# Patient Record
Sex: MALE | Race: WHITE | NOT HISPANIC OR LATINO | Employment: OTHER | URBAN - METROPOLITAN AREA
[De-identification: names, ages, dates, MRNs, and addresses within clinical notes are randomized per-mention and may not be internally consistent; named-entity substitution may affect disease eponyms.]

---

## 2019-02-11 ENCOUNTER — OFFICE VISIT (OUTPATIENT)
Dept: URGENT CARE | Facility: MEDICAL CENTER | Age: 64
End: 2019-02-11
Payer: COMMERCIAL

## 2019-02-11 VITALS
TEMPERATURE: 98.7 F | DIASTOLIC BLOOD PRESSURE: 70 MMHG | HEIGHT: 69 IN | BODY MASS INDEX: 26.51 KG/M2 | WEIGHT: 179 LBS | HEART RATE: 76 BPM | OXYGEN SATURATION: 94 % | SYSTOLIC BLOOD PRESSURE: 126 MMHG

## 2019-02-11 DIAGNOSIS — J10.1 INFLUENZA A: ICD-10-CM

## 2019-02-11 DIAGNOSIS — H66.001 ACUTE SUPPURATIVE OTITIS MEDIA OF RIGHT EAR WITHOUT SPONTANEOUS RUPTURE OF TYMPANIC MEMBRANE, RECURRENCE NOT SPECIFIED: ICD-10-CM

## 2019-02-11 DIAGNOSIS — I10 ESSENTIAL HYPERTENSION: ICD-10-CM

## 2019-02-11 DIAGNOSIS — R05.9 COUGH: ICD-10-CM

## 2019-02-11 LAB
FLUAV+FLUBV AG SPEC QL IA: NORMAL
INT CON NEG: NEGATIVE
INT CON POS: POSITIVE

## 2019-02-11 PROCEDURE — 99203 OFFICE O/P NEW LOW 30 MIN: CPT | Performed by: PHYSICIAN ASSISTANT

## 2019-02-11 PROCEDURE — 87804 INFLUENZA ASSAY W/OPTIC: CPT | Performed by: PHYSICIAN ASSISTANT

## 2019-02-11 RX ORDER — AMOXICILLIN 875 MG/1
875 TABLET, COATED ORAL 2 TIMES DAILY
Qty: 14 TAB | Refills: 0 | Status: SHIPPED | OUTPATIENT
Start: 2019-02-11 | End: 2019-02-18

## 2019-02-11 RX ORDER — OSELTAMIVIR PHOSPHATE 75 MG/1
75 CAPSULE ORAL 2 TIMES DAILY
Qty: 10 CAP | Refills: 0 | Status: SHIPPED | OUTPATIENT
Start: 2019-02-11 | End: 2019-02-16

## 2019-02-11 RX ORDER — DEXTROMETHORPHAN HYDROBROMIDE AND PROMETHAZINE HYDROCHLORIDE 15; 6.25 MG/5ML; MG/5ML
5 SYRUP ORAL 4 TIMES DAILY PRN
Qty: 100 ML | Refills: 0 | Status: SHIPPED | OUTPATIENT
Start: 2019-02-11 | End: 2019-02-16

## 2019-02-11 RX ORDER — BENZONATATE 100 MG/1
200 CAPSULE ORAL 3 TIMES DAILY PRN
Qty: 60 CAP | Refills: 0 | Status: SHIPPED | OUTPATIENT
Start: 2019-02-11 | End: 2019-03-25

## 2019-02-11 ASSESSMENT — ENCOUNTER SYMPTOMS
HEADACHES: 1
MYALGIAS: 1
DIZZINESS: 1
COUGH: 0
VOMITING: 0
CHILLS: 1
FEVER: 1
DIARRHEA: 0
NAUSEA: 0
SORE THROAT: 1

## 2019-02-12 ENCOUNTER — TELEPHONE (OUTPATIENT)
Dept: SCHEDULING | Facility: IMAGING CENTER | Age: 64
End: 2019-02-12

## 2019-02-12 NOTE — PATIENT INSTRUCTIONS
"Otitis Media, Adult  Otitis media is redness, soreness, and puffiness (swelling) in the space just behind your eardrum (middle ear). It may be caused by allergies or infection. It often happens along with a cold.  Follow these instructions at home:  · Take your medicine as told. Finish it even if you start to feel better.  · Only take over-the-counter or prescription medicines for pain, discomfort, or fever as told by your doctor.  · Follow up with your doctor as told.  Contact a doctor if:  · You have otitis media only in one ear, or bleeding from your nose, or both.  · You notice a lump on your neck.  · You are not getting better in 3-5 days.  · You feel worse instead of better.  Get help right away if:  · You have pain that is not helped with medicine.  · You have puffiness, redness, or pain around your ear.  · You get a stiff neck.  · You cannot move part of your face (paralysis).  · You notice that the bone behind your ear hurts when you touch it.  This information is not intended to replace advice given to you by your health care provider. Make sure you discuss any questions you have with your health care provider.  Document Released: 06/05/2009 Document Revised: 05/25/2017 Document Reviewed: 07/15/2014  [employer] Interactive Patient Education © 2017 [employer]  Influenza, Adult  Influenza (“the flu\") is an infection in the lungs, nose, and throat (respiratory tract). It is caused by a virus. The flu causes many common cold symptoms, as well as a high fever and body aches. It can make you feel very sick.  The flu spreads easily from person to person (is contagious). Getting a flu shot (influenza vaccination) every year is the best way to prevent the flu.  Follow these instructions at home:  · Take over-the-counter and prescription medicines only as told by your doctor.  · Use a cool mist humidifier to add moisture (humidity) to the air in your home. This can make it easier to breathe.  · Rest as " needed.  · Drink enough fluid to keep your pee (urine) clear or pale yellow.  · Cover your mouth and nose when you cough or sneeze.  · Wash your hands with soap and water often, especially after you cough or sneeze. If you cannot use soap and water, use hand .  · Stay home from work or school as told by your doctor. Unless you are visiting your doctor, try to avoid leaving home until your fever has been gone for 24 hours without the use of medicine.  · Keep all follow-up visits as told by your doctor. This is important.  How is this prevented?  · Getting a yearly (annual) flu shot is the best way to avoid getting the flu. You may get the flu shot in late summer, fall, or winter. Ask your doctor when you should get your flu shot.  · Wash your hands often or use hand  often.  · Avoid contact with people who are sick during cold and flu season.  · Eat healthy foods.  · Drink plenty of fluids.  · Get enough sleep.  · Exercise regularly.  Contact a doctor if:  · You get new symptoms.  · You have:  ¨ Chest pain.  ¨ Watery poop (diarrhea).  ¨ A fever.  · Your cough gets worse.  · You start to have more mucus.  · You feel sick to your stomach (nauseous).  · You throw up (vomit).  Get help right away if:  · You start to be short of breath or have trouble breathing.  · Your skin or nails turn a bluish color.  · You have very bad pain or stiffness in your neck.  · You get a sudden headache.  · You get sudden pain in your face or ear.  · You cannot stop throwing up.  This information is not intended to replace advice given to you by your health care provider. Make sure you discuss any questions you have with your health care provider.  Document Released: 09/26/2009 Document Revised: 05/25/2017 Document Reviewed: 10/11/2016  [employer] Interactive Patient Education © 2017 [employer]

## 2019-02-12 NOTE — PROGRESS NOTES
Subjective:   Isma Vyas is a 63 y.o. male who presents for Nasal Congestion (thinks has the flu, started saturday, headaches, ear pain, off balance, cant sleep, fever, hard to take deep breaths, cough, congested)    This is a new problem.  Patient presents to urgent care with 2-1/2-day history of fever, chills, body aches, nasal congestion and cough.  Patient has been doing a [place] pot and reports significant nasal drainage after using the [place] pot.  He also has been complaining of feeling somewhat off balance with right ear pain that is intermittent and a headache.  The patient reports that he is not been able to sleep well because of feeling ill.  The patient did receive an influenza vaccine this season.    Past medical history, family history and social history are reviewed and updated in the record today.         HPI  Past Medical History:   Diagnosis Date   • Hypertension    • Thyroid disease      History reviewed. No pertinent surgical history.  Social History     Social History   • Marital status:      Spouse name: N/A   • Number of children: N/A   • Years of education: N/A     Occupational History   • Not on file.     Social History Main Topics   • Smoking status: Never Smoker   • Smokeless tobacco: Never Used   • Alcohol use Yes      Comment: 10   • Drug use: Yes     Types: Marijuana   • Sexual activity: Not on file     Other Topics Concern   • Not on file     Social History Narrative   • No narrative on file      Family History   Problem Relation Age of Onset   • Cancer Mother    • Cancer Father       Review of Systems   Constitutional: Positive for chills, fever and malaise/fatigue.   HENT: Positive for congestion, ear pain and sore throat.    Respiratory: Negative for cough.    Cardiovascular: Negative for chest pain.   Gastrointestinal: Negative for diarrhea, nausea and vomiting.   Musculoskeletal: Positive for myalgias.   Neurological: Positive for dizziness and headaches.   All other  "systems reviewed and are negative.    No Known Allergies     Objective:   /70   Pulse 76   Temp 37.1 °C (98.7 °F) (Temporal)   Ht 1.753 m (5' 9\")   Wt 81.2 kg (179 lb)   SpO2 94%   BMI 26.43 kg/m²    Physical Exam   Constitutional: He is oriented to person, place, and time. He appears well-developed and well-nourished.  Non-toxic appearance. He appears ill.   HENT:   Head: Normocephalic and atraumatic.   Right Ear: External ear and ear canal normal. No mastoid tenderness. Tympanic membrane is injected. Tympanic membrane is not bulging. A middle ear effusion is present.   Left Ear: Tympanic membrane, external ear and ear canal normal.   Nose: Mucosal edema and rhinorrhea present. Right sinus exhibits no maxillary sinus tenderness and no frontal sinus tenderness. Left sinus exhibits no maxillary sinus tenderness and no frontal sinus tenderness.   Mouth/Throat: Uvula is midline and mucous membranes are normal. Posterior oropharyngeal erythema present. No oropharyngeal exudate or posterior oropharyngeal edema. Tonsils are 1+ on the right. Tonsils are 1+ on the left. No tonsillar exudate.   Eyes: Pupils are equal, round, and reactive to light. Conjunctivae and EOM are normal.   Neck: Normal range of motion. Neck supple.   Cardiovascular: Normal rate, regular rhythm and normal heart sounds.  Exam reveals no friction rub.    No murmur heard.  Pulmonary/Chest: Effort normal and breath sounds normal. No respiratory distress.   Abdominal: Soft. Bowel sounds are normal. There is no hepatosplenomegaly. There is no tenderness.   Musculoskeletal: Normal range of motion.   Lymphadenopathy:        Head (right side): No submental, no submandibular and no tonsillar adenopathy present.        Head (left side): No submental, no submandibular and no tonsillar adenopathy present.     He has no cervical adenopathy.        Right: No supraclavicular adenopathy present.        Left: No supraclavicular adenopathy present. "   Neurological: He is alert and oriented to person, place, and time. He has normal strength. No cranial nerve deficit or sensory deficit. Coordination normal.   Skin: Skin is warm and dry. No rash noted.   Psychiatric: He has a normal mood and affect. Judgment normal.           Assessment/Plan:   Assessment    1. Influenza A  - oseltamivir (TAMIFLU) 75 MG Cap; Take 1 Cap by mouth 2 times a day for 5 days.  Dispense: 10 Cap; Refill: 0  - POCT Influenza A/B: + Influenza A    Remainder of review of systems unable to be completed due to child's young age patient will be treated with Tamiflu as above.  Symptomatic, supportive care.  Increase fluids, rest.  Contagion reviewed.  Printed information with patient education on Tamiflu provided.      2. Acute suppurative otitis media of right ear without spontaneous rupture of tympanic membrane, recurrence not specified  - amoxicillin (AMOXIL) 875 MG tablet; Take 1 Tab by mouth 2 times a day for 7 days.  Dispense: 14 Tab; Refill: 0    Patient does have an early right otitis media.  He will be treated with amoxicillin as above.    3. Cough  - benzonatate (TESSALON) 100 MG Cap; Take 2 Caps by mouth 3 times a day as needed.  Dispense: 60 Cap; Refill: 0  - promethazine-dextromethorphan (PROMETHAZINE-DM) 6.25-15 MG/5ML syrup; Take 5 mL by mouth 4 times a day as needed for Cough for up to 5 days.  Dispense: 100 mL; Refill: 0  Patient is also given a prescription for Tessalon Perles for daytime cough and promethazine DM for nighttime cough.  Patient is instructed not to drive while taking promethazine DM as this can cause drowsiness.      4. Essential hypertension  Counseled patient on avoidance of medications containing decongestants as this can elevate blood pressure.    Differential diagnosis, natural history, supportive care, and indications for immediate follow-up discussed.    If not improving in 3-5 days, F/U with PCP or return to  or sooner if worsens  Strict ER precautions  given.    Please note that this note was created using voice recognition speech to text software. Every effort has been made to correct obvious errors.  However, I expect there are errors of grammar and possibly context that were not discovered prior to finalizing the note

## 2019-03-25 ENCOUNTER — OFFICE VISIT (OUTPATIENT)
Dept: INTERNAL MEDICINE | Facility: MEDICAL CENTER | Age: 64
End: 2019-03-25
Payer: COMMERCIAL

## 2019-03-25 VITALS
HEART RATE: 62 BPM | BODY MASS INDEX: 26.42 KG/M2 | OXYGEN SATURATION: 94 % | TEMPERATURE: 98.1 F | DIASTOLIC BLOOD PRESSURE: 90 MMHG | WEIGHT: 178.4 LBS | HEIGHT: 69 IN | SYSTOLIC BLOOD PRESSURE: 140 MMHG

## 2019-03-25 DIAGNOSIS — J30.89 ENVIRONMENTAL AND SEASONAL ALLERGIES: ICD-10-CM

## 2019-03-25 DIAGNOSIS — F41.0 PANIC ANXIETY SYNDROME: ICD-10-CM

## 2019-03-25 DIAGNOSIS — R19.7 DIARRHEA IN ADULT PATIENT: ICD-10-CM

## 2019-03-25 DIAGNOSIS — I83.893 VARICOSE VEINS OF BILATERAL LOWER EXTREMITIES WITH OTHER COMPLICATIONS: ICD-10-CM

## 2019-03-25 DIAGNOSIS — I10 ESSENTIAL HYPERTENSION: ICD-10-CM

## 2019-03-25 DIAGNOSIS — Z00.00 HEALTH CARE MAINTENANCE: ICD-10-CM

## 2019-03-25 DIAGNOSIS — Z87.828 HX OF TEAR OF MENISCUS OF KNEE JOINT: ICD-10-CM

## 2019-03-25 DIAGNOSIS — J32.8 OTHER CHRONIC SINUSITIS: ICD-10-CM

## 2019-03-25 DIAGNOSIS — E03.8 OTHER SPECIFIED HYPOTHYROIDISM: ICD-10-CM

## 2019-03-25 PROBLEM — I83.93 VARICOSE VEINS OF BOTH LOWER EXTREMITIES: Status: ACTIVE | Noted: 2019-03-25

## 2019-03-25 PROBLEM — F40.243 ANXIETY WITH FLYING: Status: ACTIVE | Noted: 2019-03-25

## 2019-03-25 PROCEDURE — 99204 OFFICE O/P NEW MOD 45 MIN: CPT | Mod: GC | Performed by: INTERNAL MEDICINE

## 2019-03-25 RX ORDER — LOSARTAN POTASSIUM 100 MG/1
100 TABLET ORAL DAILY
COMMUNITY
End: 2019-04-25 | Stop reason: SDUPTHER

## 2019-03-25 RX ORDER — LEVOTHYROXINE SODIUM 0.05 MG/1
50 TABLET ORAL
COMMUNITY
End: 2019-05-28 | Stop reason: SDUPTHER

## 2019-03-25 RX ORDER — AZELASTINE 1 MG/ML
1 SPRAY, METERED NASAL 2 TIMES DAILY
COMMUNITY
End: 2021-11-29 | Stop reason: SDUPTHER

## 2019-03-25 RX ORDER — FLUTICASONE PROPIONATE 50 MCG
1 SPRAY, SUSPENSION (ML) NASAL DAILY
COMMUNITY

## 2019-03-25 RX ORDER — HYDROXYZINE HYDROCHLORIDE 25 MG/1
25 TABLET, FILM COATED ORAL 3 TIMES DAILY PRN
Qty: 30 TAB | Refills: 2 | Status: SHIPPED | OUTPATIENT
Start: 2019-03-25 | End: 2021-10-27

## 2019-03-25 RX ORDER — SILDENAFIL 100 MG/1
100 TABLET, FILM COATED ORAL PRN
COMMUNITY

## 2019-03-25 RX ORDER — DIPHENOXYLATE HYDROCHLORIDE AND ATROPINE SULFATE 2.5; .025 MG/1; MG/1
1 TABLET ORAL 4 TIMES DAILY PRN
COMMUNITY

## 2019-03-25 ASSESSMENT — PATIENT HEALTH QUESTIONNAIRE - PHQ9: CLINICAL INTERPRETATION OF PHQ2 SCORE: 0

## 2019-03-25 NOTE — PROGRESS NOTES
New Patient to Establish    Reason to establish: New patient to establish    CC: new patient to establish    HPI: 63M, PMH of numerous sinus infections (more so as a child), HTN, hypothyroid, environmental and seasonal allergies, panic anxiety syndrome, varicose veins, hx of meniscus tear; presents as a new patient to establish. His only current complaints are pruritis with his varicose veins and left knee pain that was exacerbated helping his wife move with his history of meniscus tear.     He does have panic anxiety syndrome, most notable with flying after a traumatic experience but occasionally sporadically and takes xanax prn. He takes viagra because of the side effects of losartan. He also occasionally gets diarrhea for 1-2 days every 2 months or so due to changes in diet from travelling and takes lomotil.    His past surgeries and injuries include a nasal deviation surgery that moved the deviation to the other side per patient. Minor meniscus tear 15y/o for his left knee with repair which showed significant arthritis which he uses aleeve for relief. Hernia repair 5-6y/a for umbilical, right inguinal, and one other he is unable to recall. Back injury 8-10y/a where he fell and broke some ribs was taking opiods for 6 weeks but otherwise resolved. He has a ripped right elbow tendon s/p repair but occasionally has elbow joint swelling. Cataract surgery 3-4 y/a, some floaters after but otherwise no complications.    He currently lives in the [facility] area with his partner, recently moved from [place], currently works as a consultant for [employer]. He and his partner are both  but otherwise live separately from their spouses. He denies tobacco use history, occasional etoh and marijuana use, no other drugs. He was previously taking truvada, but has been monogamous with his partner.    He states he had an anal pap showing ACUS with a specialist in [place] stating he should be followed up in 2-3 years, he was  unable to specify which kind of specialist, will follow up once records obtained. Last colonoscopy within 1-2years; f/u in 5-10year; will get records.      There are no active problems to display for this patient.      Past Medical History:   Diagnosis Date   • Hypertension    • Thyroid disease        Current Outpatient Prescriptions   Medication Sig Dispense Refill   • losartan (COZAAR) 100 MG Tab Take 100 mg by mouth every day.     • levothyroxine (SYNTHROID) 50 MCG Tab Take 50 mcg by mouth Every morning on an empty stomach.     • azelastine (ASTELIN) 137 MCG/SPRAY nasal spray [place] 1 Spray in nose 2 times a day.     • fluticasone (FLONASE) 50 MCG/ACT nasal spray Spray 1 Spray in nose every day.     • diphenoxylate-atropine (LOMOTIL) 2.5-0.025 MG Tab Take 1 Tab by mouth 4 times a day as needed for Diarrhea.     • sildenafil citrate (VIAGRA) 100 MG tablet Take 100 mg by mouth as needed for Erectile Dysfunction.     • ALPRAZolam (XANAX PO) Take  by mouth.     • CEFPODOXIME PROXETIL PO Take  by mouth.     • PrednisoLONE Sodium Phosphate (PREDNISOL OP) by Ophthalmic route.     • benzonatate (TESSALON) 100 MG Cap Take 2 Caps by mouth 3 times a day as needed. 60 Cap 0     No current facility-administered medications for this visit.        Allergies as of 03/25/2019   • (No Known Allergies)       Social History     Social History   • Marital status:      Spouse name: N/A   • Number of children: N/A   • Years of education: N/A     Occupational History   • Not on file.     Social History Main Topics   • Smoking status: Never Smoker   • Smokeless tobacco: Never Used   • Alcohol use 4.2 - 6.0 oz/week     7 - 10 Cans of beer per week   • Drug use: Yes     Types: Marijuana   • Sexual activity: Not on file     Other Topics Concern   • Not on file     Social History Narrative   • No narrative on file       Family History   Problem Relation Age of Onset   • Cancer Mother    • Cancer Father        History reviewed. No  "pertinent surgical history.    ROS: As per HPI. Additional pertinent symptoms as noted below.    Constitutional: denies fever/chills  Eyes: denies vision changes/eye pain  ENT: denies hearing changes, ear/nose/mouth/throat pain  Cardiovascular: denies chest pain/palpitations  Respiratory: denies dyspnea, cough, wheezing  GI: denies abdominal pain, nausea, vomiting, diarrhea, constipation, hematechezia, melena  : denies dysuria, hematuria  Musculo-skeletal: denies muscle,joint,bone pain  Skin: pruritis denies skin changes, rashes  Neurological: denies lightheadedness, headaches  Psychological: denies anxiety, depression      /90 (BP Location: Left arm, Patient Position: Sitting, BP Cuff Size: Adult)   Pulse 62   Temp 36.7 °C (98.1 °F) (Temporal)   Ht 1.74 m (5' 8.5\")   Wt 80.9 kg (178 lb 6.4 oz)   SpO2 94%   BMI 26.73 kg/m²     Physical Exam  General:  Alert and oriented, No apparent distress.    Eyes: Pupils equal and reactive. No scleral icterus.    Throat: Clear no erythema or exudates noted.    Neck: Supple. No lymphadenopathy noted. Thyroid not enlarged.    Lungs: Clear to auscultation and percussion bilaterally.    Cardiovascular: Regular rate and rhythm. No murmurs, rubs or gallops.    Abdomen:  Benign. No rebound or guarding noted.    Extremities: No clubbing, cyanosis, edema.    Skin: Clear. No rash or suspicious skin lesions noted.          Assessment and Plan    1. Essential hypertension  -140/90 today  -continue losartan    2. Other specified hypothyroidism  -continue levothyroxine    3. Environmental and seasonal allergies  -continue astelin, flonase    4. Panic anxiety syndrome  -most notable with flying after a traumatic experience but occasionally sporadically  -continue xanax, patient reports minimal use, not requesting refill at this time  -hyroxyzine prn    5. Diarrhea in adult patient  -occasionally gets diarrhea for 1-2 days every 2 months or so due to changes in diet from " travelling  -continue lomotil    6. Hx of tear of meniscus of knee joint  -physical therapy    7. Varicose veins of bilateral lower extremities with other complications  -patient has pruritis with varicose veins  -patient uses topical OTC creams with minor relief  -hydroxyzine prn    8. Health care maintenance  -patient reports up to date with Tetanus shot, shingles vaccination, influenza vaccination  -Last colonoscopy within 1-2years; f/u in 5-10year; will get records  -patient reports he received anal pap, ACUS, specialist says should be followed up, 2-3 years; will obtain records  -patient had CBC, CMP, lipid panel, TSH, PSA frome 8/2018; reviewed with patient and wnl    9. Other chronic sinusitis  -patient reports history of recurrent sinutitis more often as a child   -per patient prednisolone, cefpodoxime are the only regimen that has worked for him   -patient cites he's tried azithromycin, amoxicillin (only helps for ears), augmentin (some relief), doxycyline (some relief to none)    Signed by: Hima Rodrigez M.D.

## 2019-03-25 NOTE — LETTER
[facility]  Hima Rodrigez M.D.  [address] [facility] [address]  [place] [address]  Fax: [phone]   Authorization for Release/Disclosure of   Protected Health Information   Name: ISMA ZUÑIGA : 1955 SSN: [id number]   Address: [address] [place] [address] [place] [place] [address] Phone:    [phone] (home)    I authorize the entity listed below to release/disclose the PHI below to:   [facility]/Hima Rodrigez M.D. and Hima Rodrigez M.D.   Provider or Entity Name: Dr. Matt Whatley     Address [address], [place], [place] Phone: [phone]      Fax:     Reason for request: continuity of care   Information to be released:    [  ] LAST COLONOSCOPY,  including any PATH REPORT and follow-up  [  ] LAST FIT/COLOGUARD RESULT [  ] LAST DEXA  [  ] LAST MAMMOGRAM  [  ] LAST PAP  [  ] LAST LABS [  ] RETINA EXAM REPORT  [  ] IMMUNIZATION RECORDS  [ x ] Release all info      [  ] Check here and initial the line next to each item to release ALL health information INCLUDING  _____ Care and treatment for drug and / or alcohol abuse  _____ HIV testing, infection status, or AIDS  _____ Genetic Testing    DATES OF SERVICE OR TIME PERIOD TO BE DISCLOSED: _____________  I understand and acknowledge that:  * This Authorization may be revoked at any time by you in writing, except if your health information has already been used or disclosed.  * Your health information that will be used or disclosed as a result of you signing this authorization could be re-disclosed by the recipient. If this occurs, your re-disclosed health information may no longer be protected by State or Federal laws.  * You may refuse to sign this Authorization. Your refusal will not affect your ability to obtain treatment.  * This Authorization becomes effective upon signing and will  on (date) __________.      If no date is indicated, this Authorization will  one (1) year from the signature date.    Name: Isma Zuñiga    Signature:   Date:          3/25/2019       PLEASE FAX REQUESTED RECORDS BACK TO: [phone]

## 2019-03-26 PROBLEM — J32.8 OTHER CHRONIC SINUSITIS: Status: ACTIVE | Noted: 2019-03-26

## 2019-04-08 ENCOUNTER — APPOINTMENT (OUTPATIENT)
Dept: PHYSICAL THERAPY | Facility: REHABILITATION | Age: 64
End: 2019-04-08
Attending: STUDENT IN AN ORGANIZED HEALTH CARE EDUCATION/TRAINING PROGRAM
Payer: COMMERCIAL

## 2019-04-10 ENCOUNTER — PHYSICAL THERAPY (OUTPATIENT)
Dept: PHYSICAL THERAPY | Facility: REHABILITATION | Age: 64
End: 2019-04-10
Attending: STUDENT IN AN ORGANIZED HEALTH CARE EDUCATION/TRAINING PROGRAM
Payer: COMMERCIAL

## 2019-04-10 DIAGNOSIS — M76.892 LEFT KNEE TENDONITIS: ICD-10-CM

## 2019-04-10 PROCEDURE — 97535 SELF CARE MNGMENT TRAINING: CPT

## 2019-04-10 PROCEDURE — 97162 PT EVAL MOD COMPLEX 30 MIN: CPT

## 2019-04-10 PROCEDURE — 97014 ELECTRIC STIMULATION THERAPY: CPT

## 2019-04-10 ASSESSMENT — ENCOUNTER SYMPTOMS
EXACERBATED BY: SQUATTING
PAIN SCALE AT HIGHEST: 6
PAIN SCALE: 2
QUALITY: SHARP
PAIN TIMING: CONSTANT
PAIN SCALE AT LOWEST: 1
PAIN TIMING: WHEN ACTIVE
QUALITY: ACHING
EXACERBATED BY: STAIR CLIMBING

## 2019-04-10 NOTE — OP THERAPY EVALUATION
Outpatient Physical Therapy  INITIAL EVALUATION    [facility] [address] [facility], [address]  [place] [address]  Phone:  [phone]    Date of Evaluation: 04/10/2019    Patient: Isma Vyas  YOB: 1955  MRN: [id number]     Referring Provider: Hima Rodrigez M.D.  [address]  [address] [place] [address]   Referring Diagnosis Personal history of other (healed) physical injury and trauma [Z87.828]     Time Calculation  Start time: 130  Stop time: 310 Time Calculation (min): 100 minutes     Physical Therapy Occurrence Codes    Date of onset of impairment:  3/25/19   Date physical therapy care plan established or reviewed:  4/10/19   Date physical therapy treatment started:  4/10/19          Chief Complaint: No chief complaint on file.    Visit Diagnoses     ICD-10-CM   1. Left knee tendonitis M76.892         Subjective:   History of Present Illness:     Mechanism of injury:  10 years ago L. menisectomy from injury when training for marathon. The surgery was minor but there was a long recovery involved. Moved in 2019, that is when knee pain intensified. I also had been hiking and running on uneven terrain in 2019 and that has been bothersome. Upcoming visit with MD regarding stem cell injection. Denies locking of knee.     Past medical Hx:  Tear in R. Triceps, complete rupture, surgical repair ; R. Rib fracture -; family hx of hand OA;   Pain:     Current pain ratin    At best pain ratin    At worst pain ratin    Location:  Posterior and anterior knee    Quality:  Aching and sharp    Pain timing:  Constant and when active    Aggravating factors:  Squatting and stair climbing    Progression:  Worsening  Hand dominance:  Right  Treatments:     Previous treatment:  Physical therapy  Patient Goals:     Patient goals for therapy:  Return to sport/leisure activities    Other patient goals:  Play tennis, running,       Past Medical History:    Diagnosis Date   • Hypertension    • Thyroid disease      No past surgical history on file.  Social History   Substance Use Topics   • Smoking status: Never Smoker   • Smokeless tobacco: Never Used   • Alcohol use 4.2 - 6.0 oz/week     7 - 10 Cans of beer per week     Family and Occupational History     Social History   • Marital status:      Spouse name: N/A   • Number of children: N/A   • Years of education: N/A       Objective     Observations     Additional Observation Details  Collapsing arches bilatera  Excessive tibial external rotation bilateral     Tenderness   Left Knee   Tenderness in the ITB, medial patella, patellar tendon and pes anserinus. No tenderness in the lateral joint line, LCL (distal), LCL (proximal), MCL (distal), MCL (proximal) and medial joint line.     Additional Tenderness Details  (+) gerdy's tubercle tenderness     Active Range of Motion   Left Knee   Normal active range of motion    Additional Active Range of Motion Details  Mild extensor lag with L. Straight leg raise    Passive Range of Motion   Left Knee   Flexion: WFL and with pain  Extension: WFL    Patellar Mobility     Additional Patellar Mobility Details  Crepitus at PFJ     Strength:      Left Knee   Flexion: 5  Extension: 4    Additional Strength Details  Mild pain anterior knee with resisted extension     Tests     Left Knee   Negative anterior drawer, lateral Tasneem, medial Tasneem, valgus stress test at 0 degrees, valgus stress test at 30 degrees, varus stress test at 0 degrees and varus stress test at 30 degrees.     Additional Tests Details  (+) riya test for ITB restriction L > R    Single leg stance: unable to hold longer than 3 seconds bilateral, no pain.         Therapeutic Exercises (CPT 48845):     1. HS stretch    2. Calf stretch    3. Piriformis stretch    4. ITB stretch    5. Lateral hip stretch     Therapeutic Treatments and Modalities:     1. Functional Training, Self Care (CPT 93049), left knee,  HEP; orthotic; high cushion shoe, activity participation/modifcation education     Time-based treatments/modalities:  Therapeutic exercise minutes (CPT 51155): 8 minutes  Functional training, self care minutes (CPT 17066): 25 minutes       Assessment, Response and Plan:   Impairments: abnormal or restricted ROM and activity intolerance    Assessment details:  Mr. Vyas is referred to PT for left knee pain that began in January to March 2019 when trail running and moving. He has history of left menisectomy 10 years ago. He feels he recovered well but the rehab afterwards was difficult. Eval findings were most consistent with tendonitis at pes anserine, gerdy's tubercle, and patellar tendon. Meniscus testing was negative, crepitus in his knee with active and passive movement was minimal, and ligamentous stress testing was strong/stable and pain free. Mr. Vyas has foot/ankle structural issues, collapsing arches, flat feet and over pronation. He also moderately stiff hips for rotation and flexion. Mr. Vyas can benefit from PT to improve hip mobility, improve foot/ankle stability and proprioception, and to improve his balance as strategies for remaining active with less knee pain.   Barriers to therapy:  Age  Prognosis: good    Goals:   Short Term Goals:   PROM knee flexion full ROM and normal end feel   No tenderness with palpation of gerdy's tubercle, pes anserine, and quad tendon  5/5 knee extension strength without pain at anterior knee  Short term goal time span:  2-4 weeks      Long Term Goals:    Patient will be independent for long term home exercise program, along with any progressions or regressions from previous, and all self care strategies   Able to run 3 miles with 0-2/10 knee pain  Able to single leg stance 15 seconds without loss of balance and knee pain  Significant improvement in perceived functional mobility through improvement in LEFS score  Long term goal time span:  4-6 weeks    Plan:    Planned therapy interventions:  E Stim Unattended (CPT 04621), Functional Training, Self Care (CPT 68066), Hot or Cold Pack Therapy (CPT 73639), Manual Therapy (CPT 18382), Neuromuscular Re-education (CPT 67484), Self Care ADL Training (CPT 86381), Therapeutic Activities (CPT 88462) and Therapeutic Exercise (CPT 18316)  Frequency:  2x week  Duration in weeks:  6  Discussed with:  Patient      Functional Limitations and Severity Modifiers      Current:     Goal:       Referring provider co-signature:  I have reviewed this plan of care and my co-signature certifies the need for services.      Physician Signature: ________________________________ Date: ______________

## 2019-04-17 NOTE — OP THERAPY DISCHARGE SUMMARY
"  Outpatient Physical Therapy  DISCHARGE SUMMARY NOTE      [facility] [address], [address]  [place] [address]  Phone:  [phone]    Date of Visit: 04/10/2019    Patient: Isma Vyas  YOB: 1955  MRN: [id number]     Referring Provider: Hima Rodrigez M.D.  [address] [place] [address]   Referring Diagnosis Personal history of other (healed) physical injury and trauma [Z87.828]     Physical Therapy Occurrence Codes    Date of onset of impairment:  3/25/19   Date physical therapy care plan established or reviewed:  4/10/19   Date physical therapy treatment started:  4/10/19          Functional Limitations and Severity Modifiers      Goal:     Discharge:         Your patient is being discharged from Physical Therapy with the following comments:   · Patient has failed to schedule or reschedule follow-up visits    Comments:  Patient had eval only than cancelled all visits because \"I found a different PT clinic that has a $10 less co-pay than [facility]\".      Limitations Remaining:      Recommendations:      Damon Alan, PT    Date: 4/17/2019  "

## 2019-04-22 ENCOUNTER — APPOINTMENT (OUTPATIENT)
Dept: PHYSICAL THERAPY | Facility: REHABILITATION | Age: 64
End: 2019-04-22
Attending: STUDENT IN AN ORGANIZED HEALTH CARE EDUCATION/TRAINING PROGRAM
Payer: COMMERCIAL

## 2019-04-25 NOTE — TELEPHONE ENCOUNTER
Was the patient seen in the last year in this department? Yes  Last seen on 3/25/19 by Dr. Rodrigez Next Appt 6/3/19  Does patient have an active prescription for medications requested? No     Received Request Via: Patient

## 2019-04-25 NOTE — TELEPHONE ENCOUNTER
----- Message from Mei Menon sent at 4/25/2019 11:02 AM PDT -----  Regarding: RX REFILL  Contact: [phone]  PT needs a refill on his losartan (COZAAR) 100 MG Tab. He said that he needed it to go to [facility] for a 3 month supply.

## 2019-04-26 ENCOUNTER — APPOINTMENT (OUTPATIENT)
Dept: PHYSICAL THERAPY | Facility: REHABILITATION | Age: 64
End: 2019-04-26
Attending: STUDENT IN AN ORGANIZED HEALTH CARE EDUCATION/TRAINING PROGRAM
Payer: COMMERCIAL

## 2019-04-26 RX ORDER — LOSARTAN POTASSIUM 100 MG/1
100 TABLET ORAL DAILY
Qty: 90 TAB | Refills: 3 | Status: SHIPPED | OUTPATIENT
Start: 2019-04-26 | End: 2022-07-08

## 2019-04-30 ENCOUNTER — APPOINTMENT (OUTPATIENT)
Dept: PHYSICAL THERAPY | Facility: REHABILITATION | Age: 64
End: 2019-04-30
Payer: COMMERCIAL

## 2019-05-28 NOTE — TELEPHONE ENCOUNTER
Was the patient seen in the last year in this department? Yes  Last seen on 3/25/19 by Dr. Rodrigez Next Appt 6/11/19  Does patient have an active prescription for medications requested? No     Received Request Via: Patient     Patient would like a return call once medication is refill.

## 2019-05-31 RX ORDER — LEVOTHYROXINE SODIUM 0.05 MG/1
50 TABLET ORAL
Qty: 90 TAB | Refills: 3 | Status: SHIPPED | OUTPATIENT
Start: 2019-05-31

## 2019-05-31 NOTE — TELEPHONE ENCOUNTER
Attempted to call patient, was unable to reach him. Sent [employer] message for alternative communication for return call on med refill.

## 2019-06-11 ENCOUNTER — OFFICE VISIT (OUTPATIENT)
Dept: INTERNAL MEDICINE | Facility: MEDICAL CENTER | Age: 64
End: 2019-06-11
Payer: COMMERCIAL

## 2019-06-11 VITALS
HEART RATE: 72 BPM | SYSTOLIC BLOOD PRESSURE: 160 MMHG | OXYGEN SATURATION: 92 % | DIASTOLIC BLOOD PRESSURE: 100 MMHG | TEMPERATURE: 98.5 F | BODY MASS INDEX: 26.36 KG/M2 | HEIGHT: 69 IN | WEIGHT: 178 LBS

## 2019-06-11 DIAGNOSIS — R85.619 ABNORMAL ANAL PAPANICOLAOU SMEAR: ICD-10-CM

## 2019-06-11 DIAGNOSIS — E78.5 DYSLIPIDEMIA: ICD-10-CM

## 2019-06-11 DIAGNOSIS — R22.9 SOFT TISSUE SWELLING: ICD-10-CM

## 2019-06-11 DIAGNOSIS — I10 ESSENTIAL HYPERTENSION: ICD-10-CM

## 2019-06-11 DIAGNOSIS — Z12.11 SCREENING FOR COLON CANCER: ICD-10-CM

## 2019-06-11 DIAGNOSIS — I83.12 VARICOSE VEINS OF LEFT LOWER EXTREMITY WITH INFLAMMATION: ICD-10-CM

## 2019-06-11 PROCEDURE — 99214 OFFICE O/P EST MOD 30 MIN: CPT | Performed by: INTERNAL MEDICINE

## 2019-06-11 NOTE — PROGRESS NOTES
Established Patient    Mr. Sunshine a 63 y.o. male who presents today with:  CC: Follow-Up (hypertension); Nodule (head); Immunizations; and Nevus (patient would like to get them checked out)        Assessment and Plan    1.  Soft tissue swelling-soft tissue swelling on the crown.  No change in 6 weeks.  Appears cystic in nature.  Likely subcutaneous cyst.  No evidence for skin malignancy.  Patient will continue to watch for now if it grows in size rapidly or he develops symptoms we can refer him for excision.    2.  Essential hypertension-uncontrolled.  Patient states that he is under a significant amount of stress in the doctor's office.  I recommend that he check his blood pressure every day different times of the day and return to clinic with the values.  For now continue with exercise, weight loss efforts and losartan.    3.  Varicose veins of left lower extremity with inflammation-he has significant varicose veins in the left leg.  He is associated with pruritus and bleeding.  I will refer him to vascular surgery.    4.  Screening for colon cancer-patient reported a colonoscopy 2016.  Records are pending.  Will order occult fecal blood test.    5.  Dyslipidemia-his ASCVD score is 15% based off of lipid panel from 2018 in [place].  We discussed the indications for statin treatment.  He is rather against statin therapy for now he would like to work on his lipid panel through lifestyle modifications.  I did inform him that it is unlikely that he will be able to reduce his risk less than 7.5% with intensive lifestyle intervention but he will try.  He will have lipids performed again in August.  Consider coronary CT scanning at the next visit    6.  Abnormal anal Pap-he had a anal Pap smear 2015 with atypical squamous cells of undetermined significance.  Supposedly have follow-up with infectious disease in [place] but we do not have records to review.  He is interested in having a repeat anal Pap which he will  perform here at the next visit.        Current Outpatient Prescriptions   Medication Sig Dispense Refill   • levothyroxine (SYNTHROID) 50 MCG Tab Take 1 Tab by mouth Every morning on an empty stomach. 90 Tab 3   • losartan (COZAAR) 100 MG Tab Take 1 Tab by mouth every day. 90 Tab 3   • azelastine (ASTELIN) 137 MCG/SPRAY nasal spray [place] 1 Spray in nose 2 times a day.     • fluticasone (FLONASE) 50 MCG/ACT nasal spray Spray 1 Spray in nose every day.     • diphenoxylate-atropine (LOMOTIL) 2.5-0.025 MG Tab Take 1 Tab by mouth 4 times a day as needed for Diarrhea.     • sildenafil citrate (VIAGRA) 100 MG tablet Take 100 mg by mouth as needed for Erectile Dysfunction.     • ALPRAZolam (XANAX PO) Take  by mouth.     • CEFPODOXIME PROXETIL PO Take  by mouth.     • PrednisoLONE Sodium Phosphate (PREDNISOL OP) by Ophthalmic route.     • hydrOXYzine HCl (ATARAX) 25 MG Tab Take 1 Tab by mouth 3 times a day as needed for Itching. 30 Tab 2     No current facility-administered medications for this visit.          followup No Follow-up on file.    This note was created using voice recognition software (Dragon). The accuracy of the dictation is limited by the abilities of the software. I have reviewed the note prior to signing, however some errors in grammar and context are still possible. If you have any questions related to this note please do not hesitate to contact our office.   _______________________________________________________    HPI:   Patient is 63-year-old gentleman with a history of hypertension, hypothyroidism and varicose veins.  He is here today review lab work from his primary care provider in [place] from August 2018.  He also has a bump on his head he would like evaluated.  He also wants to follow-up on his hypertension.     Bump on his head x 6 weeks. Non painful. Wants to have it checked. No change in size. Flesh colored.     HTN- today is hypertensive. Does not check at home but has machine. Prescribed  "losartan 100mg daily.  He is compliant with the medication.  Exercises daily.Very active physically. Healthy diet. No drinking.    History of varicose veins.  Left leg is much worse than the right.  Uses compression socks in the winter. No ulcerations.  But, lots of itching and bleeding from scratching the varicose veins.  Also has daytime fatigue in the left leg more than the right.    S/p one pneumonia vaccine.     History of anal receptive intercourse.  Anal pap with abnormal result in 2016- [facility]. \ He saw a specialist Neeru CARTAGENA physician in [place]. No problems she reported. Recommended he repeat an anal pap smear in 3 years.  He wants to do this at the next visit.  He is also requesting a male physician who was present in the clinic more often.         has a past medical history of Hypertension and Thyroid disease. He also has no past medical history of Asthma; Diabetes (HCC); or Hyperlipidemia.     reports that he has never smoked. He has never used smokeless tobacco. He reports that he drinks about 4.2 - 6.0 oz of alcohol per week . He reports that he uses drugs, including Marijuana.      ROS: Pertinent positives as stated in HPI, all others reviewed as negative:  Cardiovascular ROS: No exercise intolerance, No chest pain, No shortness of breath, No edema, No palpitations      Physical Exam  /100 (BP Location: Left arm, Patient Position: Sitting, BP Cuff Size: Adult)   Pulse 72   Temp 36.9 °C (98.5 °F) (Temporal)   Ht 1.74 m (5' 8.5\")   Wt 80.7 kg (178 lb)   SpO2 92%   BMI 26.67 kg/m²   Constitutional:  oriented to person, place, and time. No distress.   Eyes: Pupils are equal, round, and reactive to light. No scleral icterus.   Neck: Neck supple. No thyromegaly present.   Cardiovascular: Normal rate, regular rhythm and normal heart sounds.  Exam reveals no gallop and no friction rub.    No murmur heard.  Pulmonary/Chest: Breath sounds normal. Chest wall is not dull to percussion. "   Musculoskeletal:   no edema.  Left lower extremity significant anterior lateral large varicose veins.  Class III.  No ulcerations.  Hyperpigmentation of the medial malleolar area  Lymphadenopathy: no cervical adenopathy  Neurological: alert and oriented to person, place, and time.   Skin: No cyanosis. Nails show no clubbing.    Skin: Small 1 cm soft tissue swelling on the crown.  Nonpainful mobile.  No induration or abnormal borders.      Current Outpatient Prescriptions on File Prior to Visit   Medication Sig Dispense Refill   • levothyroxine (SYNTHROID) 50 MCG Tab Take 1 Tab by mouth Every morning on an empty stomach. 90 Tab 3   • losartan (COZAAR) 100 MG Tab Take 1 Tab by mouth every day. 90 Tab 3   • azelastine (ASTELIN) 137 MCG/SPRAY nasal spray [place] 1 Spray in nose 2 times a day.     • fluticasone (FLONASE) 50 MCG/ACT nasal spray Spray 1 Spray in nose every day.     • diphenoxylate-atropine (LOMOTIL) 2.5-0.025 MG Tab Take 1 Tab by mouth 4 times a day as needed for Diarrhea.     • sildenafil citrate (VIAGRA) 100 MG tablet Take 100 mg by mouth as needed for Erectile Dysfunction.     • ALPRAZolam (XANAX PO) Take  by mouth.     • CEFPODOXIME PROXETIL PO Take  by mouth.     • PrednisoLONE Sodium Phosphate (PREDNISOL OP) by Ophthalmic route.     • hydrOXYzine HCl (ATARAX) 25 MG Tab Take 1 Tab by mouth 3 times a day as needed for Itching. 30 Tab 2     No current facility-administered medications on file prior to visit.            Signed by: Jin De Los Santos M.D.

## 2019-06-11 NOTE — PATIENT INSTRUCTIONS
Record bp every day until the next visit and bring with you  I will refer you to vascular surgery  Get labs done prior to next visit

## 2019-10-09 ENCOUNTER — APPOINTMENT (RX ONLY)
Dept: URBAN - METROPOLITAN AREA CLINIC 4 | Facility: CLINIC | Age: 64
Setting detail: DERMATOLOGY
End: 2019-10-09

## 2019-10-09 DIAGNOSIS — L29.89 OTHER PRURITUS: ICD-10-CM

## 2019-10-09 DIAGNOSIS — L81.4 OTHER MELANIN HYPERPIGMENTATION: ICD-10-CM

## 2019-10-09 DIAGNOSIS — L72.11 PILAR CYST: ICD-10-CM

## 2019-10-09 DIAGNOSIS — L29.8 OTHER PRURITUS: ICD-10-CM

## 2019-10-09 DIAGNOSIS — L85.3 XEROSIS CUTIS: ICD-10-CM

## 2019-10-09 PROCEDURE — ? COUNSELING

## 2019-10-09 PROCEDURE — 99203 OFFICE O/P NEW LOW 30 MIN: CPT

## 2019-10-09 PROCEDURE — ? ADDITIONAL NOTES

## 2019-10-09 PROCEDURE — ? PRESCRIPTION

## 2019-10-09 RX ORDER — FLUOCINONIDE 0.5 MG/ML
SOLUTION TOPICAL
Qty: 1 | Refills: 3 | COMMUNITY
Start: 2019-10-09

## 2019-10-09 RX ORDER — TRETINOIN 0.25 MG/G
CREAM TOPICAL
Qty: 1 | Refills: 4 | COMMUNITY
Start: 2019-10-09

## 2019-10-09 RX ADMIN — TRETINOIN: 0.25 CREAM TOPICAL at 00:00

## 2019-10-09 RX ADMIN — FLUOCINONIDE: 0.5 SOLUTION TOPICAL at 00:00

## 2019-10-09 ASSESSMENT — LOCATION ZONE DERM
LOCATION ZONE: SCALP
LOCATION ZONE: LEG
LOCATION ZONE: TRUNK
LOCATION ZONE: ARM

## 2019-10-09 ASSESSMENT — LOCATION DETAILED DESCRIPTION DERM
LOCATION DETAILED: RIGHT ANTERIOR DISTAL UPPER ARM
LOCATION DETAILED: MID-OCCIPITAL SCALP
LOCATION DETAILED: RIGHT ANTERIOR PROXIMAL THIGH
LOCATION DETAILED: RIGHT SUPERIOR PARIETAL SCALP
LOCATION DETAILED: PERIUMBILICAL SKIN
LOCATION DETAILED: HAIR
LOCATION DETAILED: RIGHT VENTRAL PROXIMAL FOREARM
LOCATION DETAILED: EPIGASTRIC SKIN
LOCATION DETAILED: LEFT ANTERIOR DISTAL THIGH
LOCATION DETAILED: LEFT VENTRAL PROXIMAL FOREARM
LOCATION DETAILED: LEFT INFERIOR UPPER BACK
LOCATION DETAILED: MEDIAL FRONTAL SCALP
LOCATION DETAILED: LEFT ANTERIOR PROXIMAL THIGH
LOCATION DETAILED: RIGHT ANTERIOR DISTAL THIGH
LOCATION DETAILED: LEFT ANTERIOR PROXIMAL UPPER ARM

## 2019-10-09 ASSESSMENT — LOCATION SIMPLE DESCRIPTION DERM
LOCATION SIMPLE: LEFT UPPER ARM
LOCATION SIMPLE: LEFT UPPER BACK
LOCATION SIMPLE: RIGHT UPPER ARM
LOCATION SIMPLE: LEFT THIGH
LOCATION SIMPLE: RIGHT THIGH
LOCATION SIMPLE: FRONTAL SCALP
LOCATION SIMPLE: POSTERIOR SCALP
LOCATION SIMPLE: SCALP
LOCATION SIMPLE: LEFT FOREARM
LOCATION SIMPLE: ABDOMEN
LOCATION SIMPLE: HAIR
LOCATION SIMPLE: RIGHT FOREARM

## 2019-10-09 NOTE — PROCEDURE: ADDITIONAL NOTES
Additional Notes: No significant rash on exam today\\nRecommend patient start fluocinonide solution BID prn to scalp for pruritus\\nPramoxine (Sarna) lotion BID to body\\nGentle skin care discussed\\nReturn in 3 months for follow up, consider systemic work up at that time if persistent.
Detail Level: Simple
Additional Notes: Patient previously used tretinoin for his lentigines and would like a refill. Advised insurance will not cover this given cosmetic indication.\\nAdvised to wait until pruritus resolving and then should start this

## 2019-10-09 NOTE — HPI: RASH
How Severe Is Your Rash?: moderate
Is This A New Presentation, Or A Follow-Up?: Rash
Additional History: Patient states he has a history of pubic lice about 10 years ago and has been very vigilant with his skin since that time. Was concerned this was similar which is why he was prescribed permethrin by his PCP. This seems to help but he still has intermittent pruritus of his scalp, trunk, and extremities. He does not use moisturizer. He has not noticed a prominent rash or any flaking or scale. Started after visiting [place] a few months ago, sensation started in his beard.

## 2019-11-23 ENCOUNTER — HOSPITAL ENCOUNTER (OUTPATIENT)
Dept: RADIOLOGY | Facility: MEDICAL CENTER | Age: 64
End: 2019-11-23
Attending: SURGERY
Payer: MEDICARE

## 2019-11-25 ENCOUNTER — HOSPITAL ENCOUNTER (OUTPATIENT)
Dept: RADIOLOGY | Facility: MEDICAL CENTER | Age: 64
End: 2019-11-25
Attending: SURGERY
Payer: MEDICARE

## 2019-11-25 DIAGNOSIS — I87.2 PERIPHERAL VENOUS INSUFFICIENCY: ICD-10-CM

## 2019-11-25 PROCEDURE — 93971 EXTREMITY STUDY: CPT | Mod: LT

## 2019-11-25 PROCEDURE — 93971 EXTREMITY STUDY: CPT | Mod: 26 | Performed by: INTERNAL MEDICINE

## 2020-10-13 ENCOUNTER — APPOINTMENT (RX ONLY)
Dept: URBAN - METROPOLITAN AREA CLINIC 20 | Facility: CLINIC | Age: 65
Setting detail: DERMATOLOGY
End: 2020-10-13

## 2020-10-13 DIAGNOSIS — L82.1 OTHER SEBORRHEIC KERATOSIS: ICD-10-CM

## 2020-10-13 DIAGNOSIS — D18.0 HEMANGIOMA: ICD-10-CM

## 2020-10-13 DIAGNOSIS — L81.4 OTHER MELANIN HYPERPIGMENTATION: ICD-10-CM

## 2020-10-13 DIAGNOSIS — L57.0 ACTINIC KERATOSIS: ICD-10-CM

## 2020-10-13 DIAGNOSIS — B07.8 OTHER VIRAL WARTS: ICD-10-CM

## 2020-10-13 DIAGNOSIS — D22 MELANOCYTIC NEVI: ICD-10-CM

## 2020-10-13 PROBLEM — D22.5 MELANOCYTIC NEVI OF TRUNK: Status: ACTIVE | Noted: 2020-10-13

## 2020-10-13 PROBLEM — D22.71 MELANOCYTIC NEVI OF RIGHT LOWER LIMB, INCLUDING HIP: Status: ACTIVE | Noted: 2020-10-13

## 2020-10-13 PROBLEM — D18.01 HEMANGIOMA OF SKIN AND SUBCUTANEOUS TISSUE: Status: ACTIVE | Noted: 2020-10-13

## 2020-10-13 PROCEDURE — ? ADDITIONAL NOTES

## 2020-10-13 PROCEDURE — ? COUNSELING

## 2020-10-13 PROCEDURE — 99214 OFFICE O/P EST MOD 30 MIN: CPT | Mod: 25

## 2020-10-13 PROCEDURE — 17110 DESTRUCTION B9 LES UP TO 14: CPT

## 2020-10-13 PROCEDURE — ? PRESCRIPTION

## 2020-10-13 PROCEDURE — 17003 DESTRUCT PREMALG LES 2-14: CPT | Mod: 59

## 2020-10-13 PROCEDURE — 17000 DESTRUCT PREMALG LESION: CPT | Mod: 59

## 2020-10-13 PROCEDURE — ? LIQUID NITROGEN

## 2020-10-13 RX ORDER — TRETIONIN 0.25 MG/G
CREAM TOPICAL
Qty: 1 | Refills: 5

## 2020-10-13 ASSESSMENT — LOCATION SIMPLE DESCRIPTION DERM
LOCATION SIMPLE: LEFT SHOULDER
LOCATION SIMPLE: RIGHT THIGH
LOCATION SIMPLE: RIGHT UPPER BACK
LOCATION SIMPLE: LEFT PRETIBIAL REGION
LOCATION SIMPLE: CHEST
LOCATION SIMPLE: RIGHT SHOULDER
LOCATION SIMPLE: LEFT THIGH
LOCATION SIMPLE: ABDOMEN
LOCATION SIMPLE: RIGHT POSTERIOR THIGH
LOCATION SIMPLE: RIGHT SCALP
LOCATION SIMPLE: RIGHT LOWER BACK
LOCATION SIMPLE: LEFT SCALP
LOCATION SIMPLE: LEFT CHEEK

## 2020-10-13 ASSESSMENT — LOCATION DETAILED DESCRIPTION DERM
LOCATION DETAILED: LEFT ANTERIOR PROXIMAL THIGH
LOCATION DETAILED: LEFT LATERAL FRONTAL SCALP
LOCATION DETAILED: RIGHT ANTERIOR PROXIMAL THIGH
LOCATION DETAILED: RIGHT DISTAL POSTERIOR THIGH
LOCATION DETAILED: RIGHT INFERIOR UPPER BACK
LOCATION DETAILED: LEFT MEDIAL MALAR CHEEK
LOCATION DETAILED: LEFT PROXIMAL PRETIBIAL REGION
LOCATION DETAILED: RIGHT ANTERIOR SHOULDER
LOCATION DETAILED: EPIGASTRIC SKIN
LOCATION DETAILED: LEFT MEDIAL SUPERIOR CHEST
LOCATION DETAILED: RIGHT POSTERIOR SHOULDER
LOCATION DETAILED: PERIUMBILICAL SKIN
LOCATION DETAILED: LEFT POSTERIOR SHOULDER
LOCATION DETAILED: LEFT ANTERIOR SHOULDER
LOCATION DETAILED: RIGHT MEDIAL FRONTAL SCALP
LOCATION DETAILED: RIGHT SUPERIOR MEDIAL MIDBACK

## 2020-10-13 ASSESSMENT — LOCATION ZONE DERM
LOCATION ZONE: SCALP
LOCATION ZONE: TRUNK
LOCATION ZONE: FACE
LOCATION ZONE: ARM
LOCATION ZONE: LEG

## 2020-10-13 NOTE — PROCEDURE: MIPS QUALITY
Quality 110: Preventive Care And Screening: Influenza Immunization: Influenza Immunization Administered during Influenza season
Quality 226: Preventive Care And Screening: Tobacco Use: Screening And Cessation Intervention: Patient screened for tobacco use and is an ex/non-smoker
Quality 111:Pneumonia Vaccination Status For Older Adults: Pneumococcal Vaccination Previously Received
Quality 130: Documentation Of Current Medications In The Medical Record: Current Medications Documented
Quality 402: Tobacco Use And Help With Quitting Among Adolescents: Patient screened for tobacco and never smoked
Detail Level: Detailed

## 2021-02-10 ENCOUNTER — HOSPITAL ENCOUNTER (OUTPATIENT)
Facility: MEDICAL CENTER | Age: 66
End: 2021-02-10
Attending: OTOLARYNGOLOGY
Payer: MEDICARE

## 2021-02-10 PROCEDURE — 87070 CULTURE OTHR SPECIMN AEROBIC: CPT

## 2021-02-10 PROCEDURE — 87075 CULTR BACTERIA EXCEPT BLOOD: CPT

## 2021-02-10 PROCEDURE — 87205 SMEAR GRAM STAIN: CPT

## 2021-02-11 LAB
GRAM STN SPEC: NORMAL
SIGNIFICANT IND 70042: NORMAL
SITE SITE: NORMAL
SOURCE SOURCE: NORMAL

## 2021-02-15 LAB
BACTERIA SPEC ANAEROBE CULT: NORMAL
BACTERIA WND AEROBE CULT: ABNORMAL
BACTERIA WND AEROBE CULT: ABNORMAL
GRAM STN SPEC: ABNORMAL
SIGNIFICANT IND 70042: ABNORMAL
SIGNIFICANT IND 70042: NORMAL
SITE SITE: ABNORMAL
SITE SITE: NORMAL
SOURCE SOURCE: ABNORMAL
SOURCE SOURCE: NORMAL

## 2021-03-03 DIAGNOSIS — Z23 NEED FOR VACCINATION: ICD-10-CM

## 2021-10-27 ENCOUNTER — OFFICE VISIT (OUTPATIENT)
Dept: MEDICAL GROUP | Facility: MEDICAL CENTER | Age: 66
End: 2021-10-27
Payer: MEDICARE

## 2021-10-27 VITALS
BODY MASS INDEX: 27.99 KG/M2 | OXYGEN SATURATION: 95 % | DIASTOLIC BLOOD PRESSURE: 78 MMHG | TEMPERATURE: 98.8 F | SYSTOLIC BLOOD PRESSURE: 140 MMHG | RESPIRATION RATE: 20 BRPM | HEIGHT: 69 IN | WEIGHT: 189 LBS | HEART RATE: 60 BPM

## 2021-10-27 DIAGNOSIS — M70.21 OLECRANON BURSITIS OF RIGHT ELBOW: ICD-10-CM

## 2021-10-27 DIAGNOSIS — E03.8 OTHER SPECIFIED HYPOTHYROIDISM: ICD-10-CM

## 2021-10-27 DIAGNOSIS — N52.9 ERECTILE DYSFUNCTION, UNSPECIFIED ERECTILE DYSFUNCTION TYPE: ICD-10-CM

## 2021-10-27 DIAGNOSIS — K58.0 IRRITABLE BOWEL SYNDROME WITH DIARRHEA: ICD-10-CM

## 2021-10-27 DIAGNOSIS — I10 ESSENTIAL HYPERTENSION: ICD-10-CM

## 2021-10-27 DIAGNOSIS — D72.829 LEUKOCYTOSIS, UNSPECIFIED TYPE: ICD-10-CM

## 2021-10-27 DIAGNOSIS — Z13.6 SCREENING FOR ISCHEMIC HEART DISEASE: ICD-10-CM

## 2021-10-27 DIAGNOSIS — Z13.1 SCREENING FOR DIABETES MELLITUS: ICD-10-CM

## 2021-10-27 DIAGNOSIS — Z72.89 OTHER PROBLEMS RELATED TO LIFESTYLE: ICD-10-CM

## 2021-10-27 PROCEDURE — 99214 OFFICE O/P EST MOD 30 MIN: CPT | Performed by: FAMILY MEDICINE

## 2021-10-27 RX ORDER — MIRTAZAPINE 7.5 MG/1
TABLET, FILM COATED ORAL
COMMUNITY
Start: 2021-10-08 | End: 2022-01-31

## 2021-10-27 RX ORDER — MELOXICAM 7.5 MG/1
7.5 TABLET ORAL DAILY
COMMUNITY
End: 2021-10-27

## 2021-10-27 ASSESSMENT — PATIENT HEALTH QUESTIONNAIRE - PHQ9: CLINICAL INTERPRETATION OF PHQ2 SCORE: 0

## 2021-10-27 NOTE — LETTER
[facility]  Severino Beaulieu M.D.  [address]  [place] [address]  Fax: [phone]   Authorization for Release/Disclosure of   Protected Health Information   Name: MAHAD ZUÑIGA : 1955 SSN: [id number]   Address: [address] [place] [address] [place] [address] Phone:    [phone] (home)    I authorize the entity listed below to release/disclose the PHI below to:   [facility]/Severino Beaulieu M.D. and Severino Beaulieu M.D.   Provider or Entity Name:     Address   City, State, [facility]   Phone:    Fax:   Reason for request: continuity of care   Information to be released:    [  ] LAST COLONOSCOPY,  including any PATH REPORT and follow-up  [  ] LAST FIT/COLOGUARD RESULT [  ] LAST DEXA  [  ] LAST MAMMOGRAM  [  ] LAST PAP  [  ] LAST LABS [  ] RETINA EXAM REPORT  [  ] IMMUNIZATION RECORDS  [  ] Release all info      [  ] Check here and initial the line next to each item to release ALL health information INCLUDING  _____ Care and treatment for drug and / or alcohol abuse  _____ HIV testing, infection status, or AIDS  _____ Genetic Testing    DATES OF SERVICE OR TIME PERIOD TO BE DISCLOSED: _____________  I understand and acknowledge that:  * This Authorization may be revoked at any time by you in writing, except if your health information has already been used or disclosed.  * Your health information that will be used or disclosed as a result of you signing this authorization could be re-disclosed by the recipient. If this occurs, your re-disclosed health information may no longer be protected by State or Federal laws.  * You may refuse to sign this Authorization. Your refusal will not affect your ability to obtain treatment.  * This Authorization becomes effective upon signing and will  on (date) __________.      If no date is indicated, this Authorization will  one (1) year from the signature date.    Name: Mahad Zuñiga    Signature:   Date:            PLEASE FAX  REQUESTED RECORDS BACK TO: [phone]

## 2021-10-28 PROBLEM — R19.7 DIARRHEA IN ADULT PATIENT: Status: RESOLVED | Noted: 2019-03-25 | Resolved: 2021-10-28

## 2021-10-28 PROBLEM — K58.9 IRRITABLE BOWEL: Status: ACTIVE | Noted: 2021-10-28

## 2021-10-28 PROBLEM — I83.93 VARICOSE VEINS OF BOTH LOWER EXTREMITIES: Status: RESOLVED | Noted: 2019-03-25 | Resolved: 2021-10-28

## 2021-10-28 PROBLEM — F40.243 ANXIETY WITH FLYING: Status: RESOLVED | Noted: 2019-03-25 | Resolved: 2021-10-28

## 2021-10-28 PROBLEM — Z87.828 HX OF TEAR OF MENISCUS OF KNEE JOINT: Status: RESOLVED | Noted: 2019-03-25 | Resolved: 2021-10-28

## 2021-10-28 PROBLEM — J30.89 ENVIRONMENTAL AND SEASONAL ALLERGIES: Status: RESOLVED | Noted: 2019-03-25 | Resolved: 2021-10-28

## 2021-10-28 PROBLEM — N52.9 ERECTILE DYSFUNCTION: Status: ACTIVE | Noted: 2021-10-28

## 2021-10-28 PROBLEM — M70.20 OLECRANON BURSITIS: Status: ACTIVE | Noted: 2021-10-28

## 2021-10-28 PROBLEM — J32.8 OTHER CHRONIC SINUSITIS: Status: RESOLVED | Noted: 2019-03-26 | Resolved: 2021-10-28

## 2021-10-29 NOTE — ASSESSMENT & PLAN NOTE
About 6 weeks ago the patient developed a swelling over the right elbow joint of uncertain etiology.  This has been treated with antibiotics by his previous PCP.  I also see a recent wound culture which was negative.  Ultimately, the bursal sac ended up rupturing and a sinus tract has developed.  The patient is already scheduled to see orthopedics next week.  He does have drainage from the area.

## 2021-10-29 NOTE — PROGRESS NOTES
[facility]  Progress Note  Established Patient    Subjective:   Mahad Vyas is a 66 y.o. male here today with a chief complaint of HTN. The patient is alone. He is here to establish care.     Olecranon bursitis  About 6 weeks ago the patient developed a swelling over the right elbow joint of uncertain etiology.  This has been treated with antibiotics by his previous PCP.  I also see a recent wound culture which was negative.  Ultimately, the bursal sac ended up rupturing and a sinus tract has developed.  The patient is already scheduled to see orthopedics next week.  He does have drainage from the area.    Leukocytosis  Noted on previous labs.    Other specified hypothyroidism  Controlled with Synthroid.    Essential hypertension  At a reasonable goal.    Erectile dysfunction  The patient has erectile dysfunction for which he has used Viagra in the past.    Irritable bowel  Patient reports a history of intermittent irritable bowel syndrome that responds to Lomotil.      Current Outpatient Medications on File Prior to Visit   Medication Sig Dispense Refill   • levothyroxine (SYNTHROID) 50 MCG Tab Take 1 Tab by mouth Every morning on an empty stomach. 90 Tab 3   • losartan (COZAAR) 100 MG Tab Take 1 Tab by mouth every day. 90 Tab 3   • fluticasone (FLONASE) 50 MCG/ACT nasal spray Spray 1 Spray in nose every day.     • diphenoxylate-atropine (LOMOTIL) 2.5-0.025 MG Tab Take 1 Tab by mouth 4 times a day as needed for Diarrhea.     • mirtazapine (REMERON) 7.5 MG tablet  (Patient not taking: Reported on 10/27/2021)     • azelastine (ASTELIN) 137 MCG/SPRAY nasal spray [place] 1 Spray in nose 2 times a day.     • sildenafil citrate (VIAGRA) 100 MG tablet Take 100 mg by mouth as needed for Erectile Dysfunction. (Patient not taking: Reported on 10/27/2021)       No current facility-administered medications on file prior to visit.          Objective:     Vitals:    10/27/21 1039   BP: 140/78   BP Location:  "Left arm   Patient Position: Sitting   BP Cuff Size: Adult long   Pulse: 60   Resp: 20   Temp: 37.1 °C (98.8 °F)   TempSrc: Temporal   SpO2: 95%   Weight: 85.7 kg (189 lb)   Height: 1.74 m (5' 8.5\")       Physical Exam:  General: alert in no apparent distress.   MSK: The patient has a swollen bursal sac over the right elbow with a sinus tract identified draining transudative fluid.  No purulence identified.  No associated redness or warmth.   Cardio: RRR no m/r/g.   Resp: CTAB.         Assessment and Plan:     1. Essential hypertension  - continue current regimen.  - Comp Metabolic Panel; Future    2. Other specified hypothyroidism  Continue Synthroid.  - TSH; Future    3. Leukocytosis, unspecified type  - CBC WITH DIFFERENTIAL; Future    4. Screening for ischemic heart disease  - Lipid Profile; Future    5. Screening for diabetes mellitus  - Comp Metabolic Panel; Future    6. Other problems related to lifestyle  - HEP C VIRUS ANTIBODY; Future    7. Olecranon bursitis of right elbow  No evidence of infection.  There is an open wound and so I counseled the patient on the risk for infection and the need to go to urgent care if he develops any signs of this.  The wound was cleaned and dressed in clinic today.  Gentle pressure was applied with the dressing.  He will see orthopedics next week.    8. Erectile dysfunction, unspecified erectile dysfunction type  - PRN viagra.     9. Irritable bowel syndrome with diarrhea  - monitor.     Note: Records were requested.        Followup: Return in about 3 months (around 1/27/2022), or if symptoms worsen or fail to improve.         "

## 2021-11-03 PROBLEM — M71.021 OLECRANON BURSA ABSCESS, RIGHT: Status: ACTIVE | Noted: 2021-11-03

## 2021-11-29 RX ORDER — AZELASTINE 1 MG/ML
1 SPRAY, METERED NASAL 2 TIMES DAILY
Qty: 30 ML | Refills: 0 | Status: SHIPPED | OUTPATIENT
Start: 2021-11-29

## 2022-01-26 ENCOUNTER — HOSPITAL ENCOUNTER (OUTPATIENT)
Dept: LAB | Facility: MEDICAL CENTER | Age: 67
End: 2022-01-26
Attending: FAMILY MEDICINE
Payer: MEDICARE

## 2022-01-26 DIAGNOSIS — D72.829 LEUKOCYTOSIS, UNSPECIFIED TYPE: ICD-10-CM

## 2022-01-26 DIAGNOSIS — Z13.6 SCREENING FOR ISCHEMIC HEART DISEASE: ICD-10-CM

## 2022-01-26 DIAGNOSIS — E03.8 OTHER SPECIFIED HYPOTHYROIDISM: ICD-10-CM

## 2022-01-26 DIAGNOSIS — I10 ESSENTIAL HYPERTENSION: ICD-10-CM

## 2022-01-26 DIAGNOSIS — Z13.1 SCREENING FOR DIABETES MELLITUS: ICD-10-CM

## 2022-01-26 DIAGNOSIS — Z72.89 OTHER PROBLEMS RELATED TO LIFESTYLE: ICD-10-CM

## 2022-01-26 LAB
ALBUMIN SERPL BCP-MCNC: 4.9 G/DL (ref 3.2–4.9)
ALBUMIN/GLOB SERPL: 2 G/DL
ALP SERPL-CCNC: 75 U/L (ref 30–99)
ALT SERPL-CCNC: 22 U/L (ref 2–50)
ANION GAP SERPL CALC-SCNC: 12 MMOL/L (ref 7–16)
AST SERPL-CCNC: 20 U/L (ref 12–45)
BASOPHILS # BLD AUTO: 0.8 % (ref 0–1.8)
BASOPHILS # BLD: 0.06 K/UL (ref 0–0.12)
BILIRUB SERPL-MCNC: 0.8 MG/DL (ref 0.1–1.5)
BUN SERPL-MCNC: 19 MG/DL (ref 8–22)
CALCIUM SERPL-MCNC: 9.4 MG/DL (ref 8.5–10.5)
CHLORIDE SERPL-SCNC: 100 MMOL/L (ref 96–112)
CHOLEST SERPL-MCNC: 238 MG/DL (ref 100–199)
CO2 SERPL-SCNC: 28 MMOL/L (ref 20–33)
CREAT SERPL-MCNC: 1.02 MG/DL (ref 0.5–1.4)
EOSINOPHIL # BLD AUTO: 0.39 K/UL (ref 0–0.51)
EOSINOPHIL NFR BLD: 5 % (ref 0–6.9)
ERYTHROCYTE [DISTWIDTH] IN BLOOD BY AUTOMATED COUNT: 43.3 FL (ref 35.9–50)
FASTING STATUS PATIENT QL REPORTED: NORMAL
GLOBULIN SER CALC-MCNC: 2.5 G/DL (ref 1.9–3.5)
GLUCOSE SERPL-MCNC: 90 MG/DL (ref 65–99)
HCT VFR BLD AUTO: 51.4 % (ref 42–52)
HCV AB SER QL: NORMAL
HDLC SERPL-MCNC: 52 MG/DL
HGB BLD-MCNC: 17.7 G/DL (ref 14–18)
IMM GRANULOCYTES # BLD AUTO: 0.07 K/UL (ref 0–0.11)
IMM GRANULOCYTES NFR BLD AUTO: 0.9 % (ref 0–0.9)
LDLC SERPL CALC-MCNC: 146 MG/DL
LYMPHOCYTES # BLD AUTO: 1.6 K/UL (ref 1–4.8)
LYMPHOCYTES NFR BLD: 20.4 % (ref 22–41)
MCH RBC QN AUTO: 31.4 PG (ref 27–33)
MCHC RBC AUTO-ENTMCNC: 34.4 G/DL (ref 33.7–35.3)
MCV RBC AUTO: 91.1 FL (ref 81.4–97.8)
MONOCYTES # BLD AUTO: 0.64 K/UL (ref 0–0.85)
MONOCYTES NFR BLD AUTO: 8.1 % (ref 0–13.4)
NEUTROPHILS # BLD AUTO: 5.1 K/UL (ref 1.82–7.42)
NEUTROPHILS NFR BLD: 64.8 % (ref 44–72)
NRBC # BLD AUTO: 0 K/UL
NRBC BLD-RTO: 0 /100 WBC
PLATELET # BLD AUTO: 245 K/UL (ref 164–446)
PMV BLD AUTO: 9.9 FL (ref 9–12.9)
POTASSIUM SERPL-SCNC: 3.7 MMOL/L (ref 3.6–5.5)
PROT SERPL-MCNC: 7.4 G/DL (ref 6–8.2)
RBC # BLD AUTO: 5.64 M/UL (ref 4.7–6.1)
SODIUM SERPL-SCNC: 140 MMOL/L (ref 135–145)
TRIGL SERPL-MCNC: 199 MG/DL (ref 0–149)
TSH SERPL DL<=0.005 MIU/L-ACNC: 4.46 UIU/ML (ref 0.38–5.33)
WBC # BLD AUTO: 7.9 K/UL (ref 4.8–10.8)

## 2022-01-26 PROCEDURE — 80061 LIPID PANEL: CPT

## 2022-01-26 PROCEDURE — 80053 COMPREHEN METABOLIC PANEL: CPT

## 2022-01-26 PROCEDURE — 36415 COLL VENOUS BLD VENIPUNCTURE: CPT

## 2022-01-26 PROCEDURE — 85025 COMPLETE CBC W/AUTO DIFF WBC: CPT

## 2022-01-26 PROCEDURE — 84443 ASSAY THYROID STIM HORMONE: CPT

## 2022-01-26 PROCEDURE — 86803 HEPATITIS C AB TEST: CPT

## 2022-01-31 ENCOUNTER — OFFICE VISIT (OUTPATIENT)
Dept: MEDICAL GROUP | Facility: MEDICAL CENTER | Age: 67
End: 2022-01-31
Payer: MEDICARE

## 2022-01-31 VITALS
HEART RATE: 89 BPM | HEIGHT: 69 IN | RESPIRATION RATE: 20 BRPM | BODY MASS INDEX: 28.14 KG/M2 | SYSTOLIC BLOOD PRESSURE: 128 MMHG | WEIGHT: 190 LBS | DIASTOLIC BLOOD PRESSURE: 72 MMHG | OXYGEN SATURATION: 95 % | TEMPERATURE: 98 F

## 2022-01-31 DIAGNOSIS — Z91.89 OTHER SPECIFIED PERSONAL RISK FACTORS, NOT ELSEWHERE CLASSIFIED: ICD-10-CM

## 2022-01-31 DIAGNOSIS — E78.5 DYSLIPIDEMIA: ICD-10-CM

## 2022-01-31 DIAGNOSIS — G47.00 INSOMNIA, UNSPECIFIED TYPE: ICD-10-CM

## 2022-01-31 DIAGNOSIS — Z00.00 HEALTHCARE MAINTENANCE: ICD-10-CM

## 2022-01-31 DIAGNOSIS — M70.21 OLECRANON BURSITIS OF RIGHT ELBOW: ICD-10-CM

## 2022-01-31 PROBLEM — D72.829 LEUKOCYTOSIS: Status: RESOLVED | Noted: 2021-10-27 | Resolved: 2022-01-31

## 2022-01-31 PROBLEM — M71.021 OLECRANON BURSA ABSCESS, RIGHT: Status: RESOLVED | Noted: 2021-11-03 | Resolved: 2022-01-31

## 2022-01-31 PROBLEM — M70.20 OLECRANON BURSITIS: Status: RESOLVED | Noted: 2021-10-28 | Resolved: 2022-01-31

## 2022-01-31 PROCEDURE — 99214 OFFICE O/P EST MOD 30 MIN: CPT | Performed by: FAMILY MEDICINE

## 2022-01-31 RX ORDER — AMLODIPINE BESYLATE 5 MG/1
1 TABLET ORAL
COMMUNITY
Start: 2022-01-31 | End: 2022-07-08

## 2022-01-31 RX ORDER — HYDROCHLOROTHIAZIDE 25 MG/1
25 TABLET ORAL DAILY
COMMUNITY
Start: 2022-01-31 | End: 2022-07-08

## 2022-01-31 RX ORDER — MAGNESIUM OXIDE 500 MG
1 TABLET ORAL
Qty: 30 TABLET | Refills: 0 | Status: SHIPPED | OUTPATIENT
Start: 2022-01-31 | End: 2022-05-04

## 2022-01-31 ASSESSMENT — FIBROSIS 4 INDEX: FIB4 SCORE: 1.17

## 2022-01-31 ASSESSMENT — PATIENT HEALTH QUESTIONNAIRE - PHQ9: CLINICAL INTERPRETATION OF PHQ2 SCORE: 0

## 2022-01-31 NOTE — ASSESSMENT & PLAN NOTE
"Patient reports difficulty falling back to sleep after waking in the night to urinate. Has tried mirtazapine but this caused next-day somnolence. He had a PSA in 4/2021 which was normal and has been told that he doesn't have an enlarged prostate. If he uses \"gummies\" he doesn't have this problem.   "

## 2022-01-31 NOTE — PROGRESS NOTES
"[facility]  Progress Note  Established Patient    Subjective:   Mahad Vyas is a 66 y.o. male here today with a chief complaint of DLD. The patient is alone.     Dyslipidemia    The 10-year ASCVD risk score (Elenben DE LOS SANTOS Jr., et al., 2013) is: 18.5%      Healthcare maintenance  Lipids: done 2022.   Fasting Glucose: done 2022.   Hepatitis C Screen: done 2022.   Colonoscopy: reports was done, records requested.   PSA: done 4/2021 and normal.     Pneumonia vaccine: Prevnar given 2016, Pneumovax given 2020.   Tdap: given 2012.   Shingrix vaccine: recommended, patient directed to pharmacy.   COVID vaccine: completed.     Olecranon bursitis  S/p surgery, following with ortho.     Insomnia  Patient reports difficulty falling back to sleep after waking in the night to urinate. Has tried mirtazapine but this caused next-day somnolence. He had a PSA in 4/2021 which was normal and has been told that he doesn't have an enlarged prostate. If he uses \"gummies\" he doesn't have this problem.       Current Outpatient Medications on File Prior to Visit   Medication Sig Dispense Refill   • amLODIPine (NORVASC) 5 MG Tab Take 1 Tablet by mouth every day.     • hydroCHLOROthiazide (HYDRODIURIL) 25 MG Tab Take 25 mg by mouth every day.     • vitamin D (VITAMIND D3) 1000 UNIT Tab Take 1,000 Units by mouth every day.     • azelastine (ASTELIN) 137 MCG/SPRAY nasal spray Administer 1 Spray into affected nostril(S) 2 times a day. 30 mL 0   • levothyroxine (SYNTHROID) 50 MCG Tab Take 1 Tab by mouth Every morning on an empty stomach. 90 Tab 3   • losartan (COZAAR) 100 MG Tab Take 1 Tab by mouth every day. 90 Tab 3   • fluticasone (FLONASE) 50 MCG/ACT nasal spray Spray 1 Spray in nose every day.     • diphenoxylate-atropine (LOMOTIL) 2.5-0.025 MG Tab Take 1 Tab by mouth 4 times a day as needed for Diarrhea.     • sildenafil citrate (VIAGRA) 100 MG tablet Take 100 mg by mouth as needed for Erectile Dysfunction.       No current " "facility-administered medications on file prior to visit.          Objective:     Vitals:    01/31/22 1429   BP: 128/72   BP Location: Left arm   Patient Position: Sitting   BP Cuff Size: Adult long   Pulse: 89   Resp: 20   Temp: 36.7 °C (98 °F)   TempSrc: Temporal   SpO2: 95%   Weight: 86.2 kg (190 lb)   Height: 1.74 m (5' 8.5\")       Physical Exam:  General: alert in no apparent distress.   MSK: R elbow incision healing well.         Assessment and Plan:     1. Dyslipidemia  - discussed risk of heart attack and stroke and recommended statin. Patient defers and will work on diet/exercise. Is interested in coronary calcium score.   - CT-HEART W/O CONT EVAL CALCIUM; Future    2. Healthcare maintenance  - see HPI.     3. Insomnia, unspecified type  Will try melatonin ER for sleep maint insomnia.  - Melatonin ER 5 MG Tab CR; Take 1 Tablet by mouth at bedtime.  Dispense: 30 Tablet; Refill: 0    4. Other specified personal risk factors, not elsewhere classified  - CT-HEART W/O CONT EVAL CALCIUM; Future    5. Olecranon bursitis of right elbow  - f/u ortho.         Followup: Return in about 3 months (around 4/30/2022), or if symptoms worsen or fail to improve.         "

## 2022-01-31 NOTE — ASSESSMENT & PLAN NOTE
Lipids: done 2022.   Fasting Glucose: done 2022.   Hepatitis C Screen: done 2022.   Colonoscopy: reports was done, records requested.   PSA: done 4/2021 and normal.     Pneumonia vaccine: Prevnar given 2016, Pneumovax given 2020.   Tdap: given 2012.   Shingrix vaccine: recommended, patient directed to pharmacy.   COVID vaccine: completed.

## 2022-03-09 ENCOUNTER — APPOINTMENT (RX ONLY)
Dept: URBAN - METROPOLITAN AREA CLINIC 4 | Facility: CLINIC | Age: 67
Setting detail: DERMATOLOGY
End: 2022-03-09

## 2022-03-09 DIAGNOSIS — L82.1 OTHER SEBORRHEIC KERATOSIS: ICD-10-CM

## 2022-03-09 DIAGNOSIS — D18.0 HEMANGIOMA: ICD-10-CM

## 2022-03-09 DIAGNOSIS — D22 MELANOCYTIC NEVI: ICD-10-CM

## 2022-03-09 DIAGNOSIS — L30.9 DERMATITIS, UNSPECIFIED: ICD-10-CM

## 2022-03-09 DIAGNOSIS — L81.4 OTHER MELANIN HYPERPIGMENTATION: ICD-10-CM

## 2022-03-09 PROBLEM — D22.5 MELANOCYTIC NEVI OF TRUNK: Status: ACTIVE | Noted: 2022-03-09

## 2022-03-09 PROBLEM — D18.01 HEMANGIOMA OF SKIN AND SUBCUTANEOUS TISSUE: Status: ACTIVE | Noted: 2022-03-09

## 2022-03-09 PROCEDURE — ? COUNSELING

## 2022-03-09 PROCEDURE — 99213 OFFICE O/P EST LOW 20 MIN: CPT

## 2022-03-09 PROCEDURE — ? PRESCRIPTION

## 2022-03-09 PROCEDURE — ? MEDICATION COUNSELING

## 2022-03-09 PROCEDURE — ? TREATMENT REGIMEN

## 2022-03-09 RX ADMIN — TRIAMCINOLONE ACETONIDE 1: 1 CREAM TOPICAL at 00:00

## 2022-03-09 ASSESSMENT — LOCATION DETAILED DESCRIPTION DERM
LOCATION DETAILED: LEFT ANTERIOR PROXIMAL THIGH
LOCATION DETAILED: RIGHT ANTERIOR PROXIMAL THIGH
LOCATION DETAILED: LEFT NARIS
LOCATION DETAILED: RIGHT MEDIAL UPPER BACK
LOCATION DETAILED: RIGHT INFERIOR MEDIAL UPPER BACK
LOCATION DETAILED: RIGHT NARIS
LOCATION DETAILED: RIGHT MID-UPPER BACK

## 2022-03-09 ASSESSMENT — LOCATION ZONE DERM
LOCATION ZONE: TRUNK
LOCATION ZONE: LEG
LOCATION ZONE: NOSE

## 2022-03-09 ASSESSMENT — LOCATION SIMPLE DESCRIPTION DERM
LOCATION SIMPLE: RIGHT THIGH
LOCATION SIMPLE: LEFT NOSE
LOCATION SIMPLE: RIGHT NOSE
LOCATION SIMPLE: LEFT THIGH
LOCATION SIMPLE: RIGHT UPPER BACK

## 2022-03-10 RX ORDER — TRIAMCINOLONE ACETONIDE 1 MG/G
CREAM TOPICAL BID
Qty: 15 | Refills: 3 | Status: ERX | COMMUNITY
Start: 2022-03-09

## 2022-03-28 ENCOUNTER — OFFICE VISIT (OUTPATIENT)
Dept: MEDICAL GROUP | Facility: MEDICAL CENTER | Age: 67
End: 2022-03-28
Payer: MEDICARE

## 2022-03-28 VITALS
HEART RATE: 62 BPM | HEIGHT: 69 IN | WEIGHT: 183.2 LBS | TEMPERATURE: 97.7 F | BODY MASS INDEX: 27.13 KG/M2 | OXYGEN SATURATION: 94 % | SYSTOLIC BLOOD PRESSURE: 110 MMHG | DIASTOLIC BLOOD PRESSURE: 72 MMHG

## 2022-03-28 DIAGNOSIS — R19.8 GI PROBLEM: ICD-10-CM

## 2022-03-28 PROBLEM — M54.50 CHRONIC RIGHT-SIDED LOW BACK PAIN WITHOUT SCIATICA: Status: RESOLVED | Noted: 2022-03-28 | Resolved: 2022-03-28

## 2022-03-28 PROBLEM — M54.50 CHRONIC RIGHT-SIDED LOW BACK PAIN WITHOUT SCIATICA: Status: ACTIVE | Noted: 2022-03-28

## 2022-03-28 PROBLEM — G89.29 CHRONIC RIGHT-SIDED LOW BACK PAIN WITHOUT SCIATICA: Status: ACTIVE | Noted: 2022-03-28

## 2022-03-28 PROBLEM — G89.29 CHRONIC RIGHT-SIDED LOW BACK PAIN WITHOUT SCIATICA: Status: RESOLVED | Noted: 2022-03-28 | Resolved: 2022-03-28

## 2022-03-28 PROCEDURE — 99214 OFFICE O/P EST MOD 30 MIN: CPT | Performed by: PHYSICIAN ASSISTANT

## 2022-03-28 RX ORDER — TRIAMCINOLONE ACETONIDE 1 MG/G
CREAM TOPICAL
COMMUNITY
Start: 2022-03-09 | End: 2023-03-09

## 2022-03-28 ASSESSMENT — FIBROSIS 4 INDEX: FIB4 SCORE: 1.17

## 2022-03-28 NOTE — ASSESSMENT & PLAN NOTE
"Few weeks ago started with change in bowels. Associated with pain/discomfort in lower abdomen and pain in low back. Mostly right sided low back pain, sometimes central or left. Has had constipation in the past treated with laxative. This time not helping. Usually has bowel movements in the morning and evacuates normally. Now having a bowel movement in the morning that is not normal - like it is \"chopped up\". Has other intestinal motions during the day and feels like he needs to have BM but doesn't really    Magnesium citrate 5ml helped a little on Saturday    Wonders/worries about blockage. Has mesh implants from previous inguinal hernias - are those a problem?    Mom  from pancreatic cancer    Dad had stomach cancer    Going to digestive health today for follow up after EGD - last colonoscopy about 4 years ago - was told he was good for 10 years    Appetite is good, no weight change. No change in color or consistency of bowel movement. No blood in stool.    No other change in the past 4-6 weeks. Many months ago over the summer started probiotics, not sure if that was related? Stopped a few days ago.    No other diet change. No major stress. Resumed regular exercise.    Standing up straight feels discomfort in lower abdomen.    No urinary symptoms. No flank pain.    Intestinal movement and standing up cause the discomfort    No other abdominal surgeries  "

## 2022-03-28 NOTE — PROGRESS NOTES
"Subjective:   Mahad Vyas is a 66 y.o. male here today for abdominal concern. PCP out of office this week. Does have appointment with his GI this afternoon.    Chief Complaint   Patient presents with   • Back Pain     Lower (R)   • GI Problem     BM have changed        GI problem  Few weeks ago started with change in bowels. Associated with pain/discomfort in lower abdomen and pain in low back. Mostly right sided low back pain, sometimes central or left. Has had constipation in the past treated with laxative. This time not helping. Usually has bowel movements in the morning and evacuates normally. Now having a bowel movement in the morning that is not normal - like it is \"chopped up\". Has other intestinal motions during the day and feels like he needs to have BM but doesn't really    Magnesium citrate 5ml helped a little on Saturday    Wonders/worries about blockage. Has mesh implants from previous inguinal hernias - are those a problem?    Mom  from pancreatic cancer    Dad had stomach cancer    Going to digestive health today for follow up after EGD - last colonoscopy about 4 years ago - was told he was good for 10 years    Appetite is good, no weight change. No change in color or consistency of bowel movement. No blood in stool.    No other change in the past 4-6 weeks. Many months ago over the summer started probiotics, not sure if that was related? Stopped a few days ago.    No other diet change. No major stress. Resumed regular exercise.    Standing up straight feels discomfort in lower abdomen.    No urinary symptoms. No flank pain.    Intestinal movement and standing up cause the discomfort    No other abdominal surgeries       Current medicines (including changes today)  Current Outpatient Medications   Medication Sig Dispense Refill   • triamcinolone acetonide (KENALOG) 0.1 % Cream PLEASE SEE ATTACHED FOR DETAILED DIRECTIONS     • amLODIPine (NORVASC) 5 MG Tab Take 1 Tablet by mouth every " "day.     • hydroCHLOROthiazide (HYDRODIURIL) 25 MG Tab Take 25 mg by mouth every day.     • vitamin D (VITAMIND D3) 1000 UNIT Tab Take 1,000 Units by mouth every day.     • Melatonin ER 5 MG Tab CR Take 1 Tablet by mouth at bedtime. 30 Tablet 0   • azelastine (ASTELIN) 137 MCG/SPRAY nasal spray Administer 1 Spray into affected nostril(S) 2 times a day. 30 mL 0   • levothyroxine (SYNTHROID) 50 MCG Tab Take 1 Tab by mouth Every morning on an empty stomach. 90 Tab 3   • losartan (COZAAR) 100 MG Tab Take 1 Tab by mouth every day. 90 Tab 3   • fluticasone (FLONASE) 50 MCG/ACT nasal spray Spray 1 Spray in nose every day.     • diphenoxylate-atropine (LOMOTIL) 2.5-0.025 MG Tab Take 1 Tab by mouth 4 times a day as needed for Diarrhea.     • sildenafil citrate (VIAGRA) 100 MG tablet Take 100 mg by mouth as needed for Erectile Dysfunction.       No current facility-administered medications for this visit.     He  has a past medical history of Hypertension and Thyroid disease.    ROS   No fever/chills. No weight change. No headache/dizziness. No focal weakness. No sore throat, nasal congestion, ear pain. No chest pain, no shortness of breath, difficulty breathing. No n/v/d. No urinary complaint. No rash or skin lesion. No joint pain or swelling.     Objective:     /72 (BP Location: Left arm, Patient Position: Sitting, BP Cuff Size: Adult long)   Pulse 62   Temp 36.5 °C (97.7 °F) (Temporal)   Ht 1.74 m (5' 8.5\")   Wt 83.1 kg (183 lb 3.2 oz)   SpO2 94%  Body mass index is 27.45 kg/m².   Physical Exam:  Constitutional: WDWN, NAD  Skin: Warm, dry, good turgor, no rashes in visible areas.  Abdomen: Soft, non-tender, no masses, no hepatosplenomegaly.  Psych: Alert and oriented x3, normal affect and mood.    Assessment and Plan:   The following treatment plan was discussed    1. GI problem - by history or physical this does not appear to be an urgent or emergent issue  Discuss with GI today  Consider labs, KUB, " ultrasound?  F/u with Dr. Beaulieu      Followup: No follow-ups on file.

## 2022-04-12 ENCOUNTER — HOSPITAL ENCOUNTER (OUTPATIENT)
Dept: LAB | Facility: MEDICAL CENTER | Age: 67
End: 2022-04-12
Attending: NURSE PRACTITIONER
Payer: MEDICARE

## 2022-04-12 LAB
BUN SERPL-MCNC: 18 MG/DL (ref 8–22)
CREAT SERPL-MCNC: 0.86 MG/DL (ref 0.5–1.4)
GFR SERPLBLD CREATININE-BSD FMLA CKD-EPI: 95 ML/MIN/1.73 M 2

## 2022-04-12 PROCEDURE — 36415 COLL VENOUS BLD VENIPUNCTURE: CPT

## 2022-04-12 PROCEDURE — 82565 ASSAY OF CREATININE: CPT

## 2022-04-12 PROCEDURE — 84520 ASSAY OF UREA NITROGEN: CPT

## 2022-04-14 ENCOUNTER — HOSPITAL ENCOUNTER (OUTPATIENT)
Dept: RADIOLOGY | Facility: MEDICAL CENTER | Age: 67
End: 2022-04-14
Attending: NURSE PRACTITIONER
Payer: MEDICARE

## 2022-04-14 DIAGNOSIS — R19.4 FREQUENT BOWEL MOVEMENTS: ICD-10-CM

## 2022-04-14 DIAGNOSIS — Z80.0 FAMILY HISTORY OF MALIGNANT NEOPLASM OF GASTROINTESTINAL TRACT: ICD-10-CM

## 2022-04-14 DIAGNOSIS — R10.33 UMBILICAL PAIN: ICD-10-CM

## 2022-04-14 PROCEDURE — 74177 CT ABD & PELVIS W/CONTRAST: CPT | Mod: MH

## 2022-04-14 PROCEDURE — 700117 HCHG RX CONTRAST REV CODE 255: Performed by: NURSE PRACTITIONER

## 2022-04-14 RX ADMIN — IOHEXOL 100 ML: 350 INJECTION, SOLUTION INTRAVENOUS at 09:35

## 2022-04-14 RX ADMIN — IOHEXOL 25 ML: 240 INJECTION, SOLUTION INTRATHECAL; INTRAVASCULAR; INTRAVENOUS; ORAL at 09:36

## 2022-04-19 ENCOUNTER — HOSPITAL ENCOUNTER (OUTPATIENT)
Dept: RADIOLOGY | Facility: MEDICAL CENTER | Age: 67
End: 2022-04-19
Attending: FAMILY MEDICINE
Payer: COMMERCIAL

## 2022-04-19 DIAGNOSIS — E78.5 DYSLIPIDEMIA: ICD-10-CM

## 2022-04-19 DIAGNOSIS — Z91.89 OTHER SPECIFIED PERSONAL RISK FACTORS, NOT ELSEWHERE CLASSIFIED: ICD-10-CM

## 2022-04-19 PROCEDURE — 4410556 CT-CARDIAC SCORING (SELF PAY ONLY)

## 2022-05-04 ENCOUNTER — OFFICE VISIT (OUTPATIENT)
Dept: MEDICAL GROUP | Facility: MEDICAL CENTER | Age: 67
End: 2022-05-04
Payer: MEDICARE

## 2022-05-04 VITALS
RESPIRATION RATE: 20 BRPM | WEIGHT: 182 LBS | TEMPERATURE: 97.7 F | SYSTOLIC BLOOD PRESSURE: 134 MMHG | BODY MASS INDEX: 26.96 KG/M2 | DIASTOLIC BLOOD PRESSURE: 82 MMHG | OXYGEN SATURATION: 97 % | HEIGHT: 69 IN | HEART RATE: 53 BPM

## 2022-05-04 DIAGNOSIS — I10 ESSENTIAL HYPERTENSION: ICD-10-CM

## 2022-05-04 DIAGNOSIS — G47.00 INSOMNIA, UNSPECIFIED TYPE: ICD-10-CM

## 2022-05-04 DIAGNOSIS — R19.8 GI PROBLEM: ICD-10-CM

## 2022-05-04 DIAGNOSIS — Z12.5 SCREENING FOR PROSTATE CANCER: ICD-10-CM

## 2022-05-04 DIAGNOSIS — E78.5 DYSLIPIDEMIA: ICD-10-CM

## 2022-05-04 DIAGNOSIS — R35.1 NOCTURIA: ICD-10-CM

## 2022-05-04 PROCEDURE — 99214 OFFICE O/P EST MOD 30 MIN: CPT | Performed by: FAMILY MEDICINE

## 2022-05-04 RX ORDER — TRAZODONE HYDROCHLORIDE 50 MG/1
50 TABLET ORAL NIGHTLY PRN
Qty: 30 TABLET | Refills: 0 | Status: SHIPPED | OUTPATIENT
Start: 2022-05-04 | End: 2022-06-13

## 2022-05-04 ASSESSMENT — FIBROSIS 4 INDEX: FIB4 SCORE: 1.17

## 2022-05-04 NOTE — PROGRESS NOTES
"[facility]  Progress Note  Established Patient    Subjective:   Mahad Vyas is a 66 y.o. male here today with a chief complaint of DLD. The patient is alone.     Dyslipidemia  The patient has a dyslipidemia with a coronary calcium score of 310.7.    Essential hypertension  His blood pressure is 134/82.  He wants to reduce his blood pressure medication.    GI problem  Previously the patient saw one of my colleagues for a GI issue.  The patient is now following with gastroenterology for this issue and has a colonoscopy upcoming tomorrow.  His symptoms have improved.    Insomnia  At the last visit the patient reported difficulty falling back to sleep after waking in the night to urinate.  He reported that he did not have BPH.  If he uses \"Gummies\" he does not have this problem.  Mirtazapine caused next-day somnolence.  I recommended melatonin extended release 5 mg.  This was ineffective.  Today the patient is also describing sleep onset insomnia.    Nocturia  Patient describes some urinary frequency and nocturia but no dysuria.      Current Outpatient Medications on File Prior to Visit   Medication Sig Dispense Refill   • triamcinolone acetonide (KENALOG) 0.1 % Cream PLEASE SEE ATTACHED FOR DETAILED DIRECTIONS     • amLODIPine (NORVASC) 5 MG Tab Take 1 Tablet by mouth every day.     • hydroCHLOROthiazide (HYDRODIURIL) 25 MG Tab Take 25 mg by mouth every day.     • vitamin D (VITAMIND D3) 1000 UNIT Tab Take 1,000 Units by mouth every day.     • azelastine (ASTELIN) 137 MCG/SPRAY nasal spray Administer 1 Spray into affected nostril(S) 2 times a day. 30 mL 0   • levothyroxine (SYNTHROID) 50 MCG Tab Take 1 Tab by mouth Every morning on an empty stomach. 90 Tab 3   • losartan (COZAAR) 100 MG Tab Take 1 Tab by mouth every day. 90 Tab 3   • fluticasone (FLONASE) 50 MCG/ACT nasal spray Spray 1 Spray in nose every day.     • diphenoxylate-atropine (LOMOTIL) 2.5-0.025 MG Tab Take 1 Tab by mouth 4 times a " "day as needed for Diarrhea.     • sildenafil citrate (VIAGRA) 100 MG tablet Take 100 mg by mouth as needed for Erectile Dysfunction.       No current facility-administered medications on file prior to visit.          Objective:     Vitals:    05/04/22 1323   BP: 134/82   BP Location: Left arm   Patient Position: Sitting   BP Cuff Size: Adult long   Pulse: (!) 53   Resp: 20   Temp: 36.5 °C (97.7 °F)   TempSrc: Temporal   SpO2: 97%   Weight: 82.6 kg (182 lb)   Height: 1.74 m (5' 8.5\")       Physical Exam:  General: alert in no apparent distress.         Assessment and Plan:     1. Insomnia, unspecified type  The patient has sleep onset and sleep maintenance insomnia.  The sleep maintenance insomnia may be more product of BPH, discussed elsewhere.  He has been nonresponsive to melatonin and did not tolerate mirtazapine.  We will try trazodone.  He was counseled not to take trazodone with any other sedating medicines or alcohol.  - traZODone (DESYREL) 50 MG Tab; Take 1 Tablet by mouth at bedtime as needed for Sleep.  Dispense: 30 Tablet; Refill: 0    2. Nocturia  Patient has nocturia along with urinary frequency but no dysuria.  He is on hydrochlorothiazide but I suspect this issue is largely driven by BPH.  I asked permission to perform a prostate exam today but he defers, stating that he will be getting a colonoscopy tomorrow.  I will obtain the following labs and recommended Flomax.  The patient states he was intolerant of Flomax in the past but has difficulty describing what this intolerance was.  I then recommended finasteride but he declines for now.  I asked him to read a little about finasteride and get back to me to see if he would like to start it.  - URINALYSIS,CULTURE IF INDICATED; Future  - PROSTATE SPECIFIC AG SCREENING; Future    3. Essential hypertension  I advised the patient not to reduce his blood pressure medicines because he is barely at goal now.  I will refer him to vascular medicine to discuss " further and see if we can consolidate the blood pressure regimen.  - Referral to Vascular Medicine    4. Dyslipidemia  I reviewed the patient's dyslipidemia in the setting of coronary calcium score with him.  I suggested a baby aspirin and statin to reduce his risk of heart attack and stroke.  The patient declines but is willing to discuss with vascular medicine.  - Referral to Vascular Medicine    5. Screening for prostate cancer  - PROSTATE SPECIFIC AG SCREENING; Future    6. GI problem  -Patient will follow up with gastroenterology.        Followup: Return in about 6 months (around 11/4/2022), or if symptoms worsen or fail to improve.

## 2022-05-04 NOTE — ASSESSMENT & PLAN NOTE
"At the last visit the patient reported difficulty falling back to sleep after waking in the night to urinate.  He reported that he did not have BPH.  If he uses \"Gummies\" he does not have this problem.  Mirtazapine caused next-day somnolence.  I recommended melatonin extended release 5 mg.  This was ineffective.  Today the patient is also describing sleep onset insomnia.  "

## 2022-05-04 NOTE — ASSESSMENT & PLAN NOTE
Previously the patient saw one of my colleagues for a GI issue.  The patient is now following with gastroenterology for this issue and has a colonoscopy upcoming tomorrow.  His symptoms have improved.

## 2022-05-17 NOTE — HPI: FULL BODY SKIN EXAMINATION
How Severe Are Your Spot(S)?: mild
What Type Of Note Output Would You Prefer (Optional)?: Bullet Format
What Is The Reason For Today's Visit?: Full Body Skin Examination
What Is The Reason For Today's Visit? (Being Monitored For X): concerning skin lesions on an annual basis
none required

## 2022-05-25 ENCOUNTER — TELEPHONE (OUTPATIENT)
Dept: MEDICAL GROUP | Facility: MEDICAL CENTER | Age: 67
End: 2022-05-25
Payer: MEDICARE

## 2022-05-25 NOTE — TELEPHONE ENCOUNTER
Pt. Left a mess to report that he was at a dinner 8 days ago, someone tested (+) for covid. Pt's been testing and the results have been negative. Pt asked how long should he wait to ensure he's negative. Pt's a symptomatic.  Dr. Beaulieu asked that I direct pt to the CDC Website.  cdc.gov/coronavirus/2019-ncov/your-health/quarantine-isolation.html?s_cid=[email or web address]:[facility]%20unvaccinated%20guidelines:addi.ga:p:RG:GM:gen:PTN:FY22#  Pt. Informed and he verbalized understanding.

## 2022-06-02 ENCOUNTER — TELEPHONE (OUTPATIENT)
Dept: SCHEDULING | Facility: IMAGING CENTER | Age: 67
End: 2022-06-02

## 2022-07-08 ENCOUNTER — TELEPHONE (OUTPATIENT)
Dept: SCHEDULING | Facility: IMAGING CENTER | Age: 67
End: 2022-07-08

## 2022-07-08 ENCOUNTER — OFFICE VISIT (OUTPATIENT)
Dept: VASCULAR LAB | Facility: MEDICAL CENTER | Age: 67
End: 2022-07-08
Attending: FAMILY MEDICINE
Payer: MEDICARE

## 2022-07-08 VITALS
HEIGHT: 69 IN | HEART RATE: 58 BPM | SYSTOLIC BLOOD PRESSURE: 132 MMHG | WEIGHT: 185 LBS | BODY MASS INDEX: 27.4 KG/M2 | DIASTOLIC BLOOD PRESSURE: 79 MMHG

## 2022-07-08 DIAGNOSIS — R93.1 AGATSTON CAC SCORE 200-399: ICD-10-CM

## 2022-07-08 DIAGNOSIS — I10 PRIMARY HYPERTENSION: ICD-10-CM

## 2022-07-08 DIAGNOSIS — I25.10 CORONARY ARTERY CALCIFICATION SEEN ON CT SCAN: ICD-10-CM

## 2022-07-08 DIAGNOSIS — E78.00 PRIMARY HYPERCHOLESTEROLEMIA: ICD-10-CM

## 2022-07-08 DIAGNOSIS — I70.0 ABDOMINAL AORTIC ATHEROSCLEROSIS (HCC): ICD-10-CM

## 2022-07-08 DIAGNOSIS — E78.2 MIXED HYPERLIPIDEMIA: ICD-10-CM

## 2022-07-08 DIAGNOSIS — N52.9 ERECTILE DYSFUNCTION, UNSPECIFIED ERECTILE DYSFUNCTION TYPE: ICD-10-CM

## 2022-07-08 PROCEDURE — 99204 OFFICE O/P NEW MOD 45 MIN: CPT | Performed by: FAMILY MEDICINE

## 2022-07-08 PROCEDURE — 99212 OFFICE O/P EST SF 10 MIN: CPT

## 2022-07-08 RX ORDER — NEOMYCIN SULFATE, POLYMYXIN B SULFATE AND DEXAMETHASONE 3.5; 10000; 1 MG/ML; [USP'U]/ML; MG/ML
SUSPENSION/ DROPS OPHTHALMIC
COMMUNITY
Start: 2022-05-09 | End: 2022-09-08

## 2022-07-08 RX ORDER — ROSUVASTATIN CALCIUM 10 MG/1
10 TABLET, COATED ORAL EVERY EVENING
Qty: 90 TABLET | Refills: 3 | Status: SHIPPED | OUTPATIENT
Start: 2022-07-08 | End: 2023-03-09

## 2022-07-08 RX ORDER — ASPIRIN 81 MG/1
81 TABLET, CHEWABLE ORAL DAILY
Qty: 100 TABLET | COMMUNITY
Start: 2022-07-08 | End: 2023-03-09 | Stop reason: SDUPTHER

## 2022-07-08 RX ORDER — OMEPRAZOLE 20 MG/1
CAPSULE, DELAYED RELEASE ORAL
COMMUNITY
Start: 2022-07-08

## 2022-07-08 RX ORDER — TELMISARTAN AND AMLODIPINE 10; 80 MG/1; MG/1
1 TABLET ORAL
Qty: 90 TABLET | Refills: 3 | Status: SHIPPED | OUTPATIENT
Start: 2022-07-08 | End: 2023-07-03

## 2022-07-08 RX ORDER — FAMOTIDINE 20 MG/1
TABLET, FILM COATED ORAL
COMMUNITY
Start: 2022-06-09 | End: 2022-09-08

## 2022-07-08 ASSESSMENT — FIBROSIS 4 INDEX: FIB4 SCORE: 1.17

## 2022-07-08 ASSESSMENT — ENCOUNTER SYMPTOMS
NAUSEA: 0
ORTHOPNEA: 0
WEAKNESS: 0
CHILLS: 0
PALPITATIONS: 0
CLAUDICATION: 0
MYALGIAS: 0
FEVER: 0
COUGH: 0
BRUISES/BLEEDS EASILY: 0

## 2022-07-08 NOTE — PROGRESS NOTES
RESISTANT HTN CLINIC - INITIAL EVALUATION   22    Mahad Vyas is a male seen for evaluation of resistant HTN and management.   Mahad Vyas is initially referred by Severino Beaulieu M.D.     Subjective    HTN:  Current/interval HTN concerns:  Ongoing elevations, prior 200s/100s, has improved.    Home BP los/80s   Adherence to current HTN meds: compliant all of the time   Lifestyle factors:  Weight Change: stable   BMI Readings from Last 3 Encounters:   22 27.32 kg/m²   22 27.27 kg/m²   22 27.45 kg/m²      Diet pattern: common adult  Daily salt intake estimate:  Low     EtOH: Yes, Details: social, usu <2/day   Exercise habits: 10k steps/day most days,  gym 4x/week  Perceived barriers to care: none   Pertinent HTN hx (reviewed at initial visit):   Age at HTN dx: >5yrs   Past HTN medications: as noted   HTN crises:  No prior hospitalization or crises   Interfering substances/contributing factors:  None    Hyperlipidemia:  Reports lengthy hx of elevated LDL and trigs, no prior meds.  Had CAC per PCP but has not have interpreted to date.   Current treatment: lifestyle changes    BASELINE LIPID:   Latest Reference Range & Units 22 08:45   Cholesterol,Tot 100 - 199 mg/dL 238 (H)   Triglycerides 0 - 149 mg/dL 199 (H)   HDL >=40 mg/dL 52   LDL <100 mg/dL 146 (H)     Antiplatelet/anticoagulation: No    Type 2 DM: No     CKD: No     Hypothyroidism:  Stable, no issues. Energy level adequate  Tolerating meds, no dosage changes   Last TSH in normal range.     Patient Active Problem List    Diagnosis Date Noted   • Abdominal aortic atherosclerosis (HCC) 2022   • Agatston CAC score 200-399 2022   • Mixed hyperlipidemia 2022   • Primary hypercholesterolemia 2022   • Nocturia 2022   • Dyslipidemia 2022   • Healthcare maintenance 2022   • Insomnia 2022   • Erectile dysfunction 10/28/2021   • Irritable bowel 10/28/2021   •  Essential hypertension 03/25/2019   • Other specified hypothyroidism 03/25/2019     Past Surgical History:   Procedure Laterality Date   • MENISCUS REPAIR     • OTHER SURGICAL PROCEDURE      Bilateral elbow orthopedic surgery       Current Outpatient Medications:   •  famotidine, , Taking  •  omeprazole, , Taking  •  traZODone, 50 mg, Oral, HS PRN, PRN  •  triamcinolone acetonide, PLEASE SEE ATTACHED FOR DETAILED DIRECTIONS, PRN  •  amLODIPine, 1 Tablet, Oral, QDAY, Taking  •  hydroCHLOROthiazide, 25 mg, Oral, DAILY, Taking  •  vitamin D, 1,000 Units, Oral, DAILY, Taking  •  azelastine, 1 Spray, Nasal, BID, Taking  •  levothyroxine, 50 mcg, Oral, AM ES, Taking  •  losartan, 100 mg, Oral, DAILY, Taking  •  fluticasone, 1 Spray, Nasal, DAILY, Taking  •  diphenoxylate-atropine, 1 Tablet, Oral, 4X/DAY PRN, PRN  •  sildenafil citrate, 100 mg, Oral, PRN, PRN  •  neomycin-polymyxin-dexamethasone, INSTILL 1 DROP INTO BOTH EYES FOUR TIMES DAILY FOR 7DAYS.IF DOESNOT IMPROVE IN3-5DAYS OR WORSENS CALL MD (Patient not taking: Reported on 7/8/2022), Not Taking   Flomax [tamsulosin]    Family History   Problem Relation Age of Onset   • Cancer Mother         pancreatic cancer   • Cancer Father         gastric cancer     Social History     Tobacco Use   • Smoking status: Never Smoker   • Smokeless tobacco: Never Used   Vaping Use   • Vaping Use: Never used   Substance Use Topics   • Alcohol use: Yes     Alcohol/week: 4.2 - 6.0 oz     Types: 7 - 10 Cans of beer per week   • Drug use: Yes     Types: Marijuana     Comment: Edibles      Review of Systems   Constitutional: Negative for chills and fever.   Respiratory: Negative for cough.    Cardiovascular: Negative for chest pain, palpitations, orthopnea, claudication and leg swelling.   Gastrointestinal: Negative for nausea.   Musculoskeletal: Negative for myalgias.   Neurological: Negative for weakness.   Endo/Heme/Allergies: Does not bruise/bleed easily.           Objective    Vitals:  "   07/08/22 1312 07/08/22 1315   BP: (!) 147/81 132/79   BP Location: Left arm Left arm   Patient Position: Sitting Sitting   BP Cuff Size: Adult Adult   Pulse: (!) 59 (!) 58   Weight: 83.9 kg (185 lb)    Height: 1.753 m (5' 9\")      Body mass index is 27.32 kg/m².  BP Readings from Last 5 Encounters:   07/08/22 132/79   05/04/22 134/82   03/28/22 110/72   01/31/22 128/72   10/27/21 140/78      Physical Exam  Vitals reviewed.   Constitutional:       General: He is not in acute distress.     Appearance: He is well-developed. He is not diaphoretic.   HENT:      Head: Normocephalic and atraumatic.   Neck:      Thyroid: No thyromegaly.      Vascular: No JVD.   Cardiovascular:      Rate and Rhythm: Normal rate and regular rhythm.      Pulses: Normal pulses.      Heart sounds: No murmur heard.  Pulmonary:      Effort: No respiratory distress.      Breath sounds: Normal breath sounds. No wheezing or rales.   Musculoskeletal:         General: No swelling or tenderness.   Neurological:      General: No focal deficit present.      Mental Status: He is oriented to person, place, and time.      Cranial Nerves: No cranial nerve deficit.      Coordination: Coordination normal.   Psychiatric:         Mood and Affect: Mood normal.         Behavior: Behavior normal.        Accessory Clinical Findings:     Lab Results   Component Value Date    CHOLSTRLTOT 238 (H) 01/26/2022     (H) 01/26/2022    HDL 52 01/26/2022    TRIGLYCERIDE 199 (H) 01/26/2022      No results found for: LIPOPROTA   No results found for: APOB         Lab Results   Component Value Date    HBA1C 5.4 02/03/2021      Lab Results   Component Value Date    SODIUM 140 01/26/2022    POTASSIUM 3.7 01/26/2022    CHLORIDE 100 01/26/2022    CO2 28 01/26/2022    GLUCOSE 90 01/26/2022    BUN 18 04/12/2022    CREATININE 0.86 04/12/2022    BUNCREATRAT 11 02/03/2021             No results found for: MICROALBCALC, MALBCRT, MALBEXCR, RGUUHB40, MICROALBUR, MICRALB, " UMICROALBUM, MICROALBTIM   VASCULAR IMAGING:     Last EKG: No results found for this or any previous visit.    LLE venous 11/2019   Status post ablation of the LEFT greater saphenous vein with no flow    demonstrated.   No evidence of deep venous thrombosis.     No prior duplex is available for comparison.    CAC 4/2022   Coronary calcification:  LMA - 0.0  LCX - 129.5  LAD - 164.7  RCA - 16.5  PDA - 0.0   Total Calcium Score: 310.7   Percentile: Calcium score is above the 50th percentile for the patient's age and sex.   Other findings:  Heart: Normal size.  Lungs: Clear.  Mediastinum: Normal.  Upper abdomen: Small hiatal hernia.    CT abd/pelvis 4/2022   Adrenal glands: Subtle nodularity of the adrenal glands without a discrete nodule identified.   Vasculature: Nonaneurysmal aorta. Mild scattered arterial calcifications.  1.  No acute intra-abdominal findings   2.  Diverticulosis.   3.  Atherosclerosis            MEDICAL DECISION-MAKING:  TODAY'S ASSESSMENT / STATUS / PLAN:  1. Primary hypercholesterolemia     2. Mixed hyperlipidemia     3. Erectile dysfunction, unspecified erectile dysfunction type     4. Agatston CAC score 200-399     5. Coronary artery calcification seen on CT scan     6. Abdominal aortic atherosclerosis (HCC)        Patient Type: Primary Prevention    1. BLOOD PRESSURE CONTROL   Qualifies as Resistant HTN (RH):  No, BP at goal with <3 meds   Office BP Goal ACC/AHA (2017) goal <130/80  Home BP at goal:  yes  Office BP at goal:  yes  24h ABPM:  not ordered to date   Device candidate? Not available at this time   Contributing factors: stress   Plan:   Monitoring:   - start/continue home BP monitoring, reviewed correct technique:  Yes   - order 24h ABPM: NO  - monitor lytes/gfr routinely     2. WORK UP FOR SECONDARY CAUSES OF RH:  Obstructive Sleep Apnea: Not Yet Assessed  Renal parenchymal disease: Excluded  Renovascular HTN: Not Yet Assessed  Primary Aldosteronism: Not Yet Assessed  Thyroid  Function: stable TSH on levothyroxine   Pheochromocytoma: Not Yet Assessed   Cushing's:   Not Yet Assessed   Coarctation of Aorta: excluded  Other: none identified    Recommendations At This Visit: as noted in orders         3. MEDICATION USE / ADHERENCE:   Assessment: Complete  Recommendations: Instructed to Continue Taking All Medications As Prescribed         4. HTN-MEDIATED END-ORGAN DAMAGE:  Left Ventricular Hypertrophy: Not Assessed on  one  Date: n/a   Urine Albuminuria: Not Yet Assessed on Date: pending   Renal Function: Chronic Kidney Disease  CKD G2 at worst   Ophthalmologic: Absent per patient report and/or eye specialist   Established Cardiovascular Disease: None    5. LIFESTYLE INTERVENTIONS:    SMOKING:   reports that he has never smoked. He has never used smokeless tobacco.   - continued complete avoidance of all tobacco products     PHYSICAL ACTIVITY: continue healthy activity to improve CV fitness.  In general, targeting >150min/week of moderate-level activity.   Continue excellent activity levels     WEIGHT MANAGEMENT AND NUTRITION: Mediterranean style dietary approach , Triglyceride-lowering diet with reduced fat calories, reduced simple carbohydrates, reduce/avoid alcohol and Provide specific dietary approach handouts    6. STANDARD HTN PHARMACOTHERAPY:  - stop losartan, start telmisartan 80mg (combo) to improve BP mgmt and combine to reduce pill burden as requested per pt   Thiazide Type Diuretic: stop HCTZ due to potential cause of ED - monitor response of BP and ED   - increase amlodipine to 10mg (combo)    7. ADDITIONAL AGENTS   Aldosterone Receptor Antagonist: not indicated   Loop Diuretic: not indicated   Peripheral Alpha Blocker: not indicated   BB: not indicated   Non-DHP-CCB: not indicated   Direct Vasodilator: not indicated   Centrally Acting Alpha Agonist: not indicated   Potassium-sparing diuretic: not indicated   DRI: not indicated     LIPID MANAGEMENT:   Qualifies for Statin Therapy  "Based on 2018 ACC/AHA Guidelines: yes, Primary Prevention - 40-74yo, LDLc >70, <190 w/o DM  The 10-year ASCVD risk score ([place] DE LOS SANTOS Jr., et al., 2013) is: 19.5%, >20% \"high risk\"    TAVARES risk with CAC score = The estimated 10-year risk of a CHD event for a person with this risk factor profile including coronary calcium is 16.1%. The estimated 10-year risk of a CHD event for a person with this risk factor profile if we did not factor in their coronary calcium score would be 11.3%.  - TAVARES - 70% for age/gender   Major ASCVD events: CAD and/or CAC >300    High-risk conditions: N/A, >64yr old  and Hypertension   Risk-enhancers: Persistently elevated trigs >174   Reviewed risks/benefits/alternatives for lipid-lowering therapies, and through shared decision-making we have opted to initiate statin therapy to lower ASCVD risk.    Currently on Statin: started   Tx goals: LDL-C <100 (consider non-HDL-C <130, apoB <90)  At goal? no  Plan:   - reinforced ongoing TLC measures as noted   - monitor labs   Meds:   - start rosuva 10mg daily     GLYCEMIA MANAGEMENT:  Normal    ANTIPLATELET/ANTICOAGULANT THERAPY:    - start ASA 81mg daily   PATTI Cardiol (2021): Cohort study, using participants from the [facility] Study cohort.  Among individuals at lower bleeding risk, a CAC score of 100+ identified individuals who would experience net benefit for aspirin therapy if at borderline or higher (?5%) 10-year ASCVD risk. Thus, a high CAC score identifies individuals estimated to derive net benefit from primary prevention aspirin therapy from those who would not, but only in the setting of lower bleeding risk and estimated ASCVD risk that is not low    OTHER ISSUES none     Studies Ordered At This Visit: none    Blood Work to Be Obtained Prior to Next Visit: as noted below   Disposition: RTC in 2 months      Oh Muir M.D.  [facility]   [facility]   [phone]   "

## 2022-07-13 ENCOUNTER — TELEPHONE (OUTPATIENT)
Dept: VASCULAR LAB | Facility: MEDICAL CENTER | Age: 67
End: 2022-07-13
Payer: MEDICARE

## 2022-07-13 NOTE — TELEPHONE ENCOUNTER
Patient called to ask if he was required to his Telmisartan-Amlodipine at night or if he could take them along with his other medications in the morning. Referred to Maricarmen Carlisle for this question and was told that he is to continue taking this at night to maintain 24 hour BP control. Called patient and left message to relay this information.

## 2022-07-18 ENCOUNTER — APPOINTMENT (OUTPATIENT)
Dept: MEDICAL GROUP | Facility: MEDICAL CENTER | Age: 67
End: 2022-07-18
Payer: MEDICARE

## 2022-07-19 ENCOUNTER — APPOINTMENT (RX ONLY)
Dept: URBAN - METROPOLITAN AREA CLINIC 4 | Facility: CLINIC | Age: 67
Setting detail: DERMATOLOGY
End: 2022-07-19

## 2022-07-19 DIAGNOSIS — L82.1 OTHER SEBORRHEIC KERATOSIS: ICD-10-CM

## 2022-07-19 PROCEDURE — ? COUNSELING

## 2022-07-19 PROCEDURE — 99212 OFFICE O/P EST SF 10 MIN: CPT

## 2022-07-19 ASSESSMENT — LOCATION DETAILED DESCRIPTION DERM
LOCATION DETAILED: LEFT INFERIOR CENTRAL MALAR CHEEK
LOCATION DETAILED: LEFT ANTERIOR PROXIMAL THIGH

## 2022-07-19 ASSESSMENT — LOCATION SIMPLE DESCRIPTION DERM
LOCATION SIMPLE: LEFT CHEEK
LOCATION SIMPLE: LEFT THIGH

## 2022-07-19 ASSESSMENT — LOCATION ZONE DERM
LOCATION ZONE: FACE
LOCATION ZONE: LEG

## 2022-07-19 NOTE — PROCEDURE: MIPS QUALITY
Quality 111:Pneumonia Vaccination Status For Older Adults: Pneumococcal vaccine administered on or after patient’s 60th birthday and before the end of the measurement period
Quality 226: Preventive Care And Screening: Tobacco Use: Screening And Cessation Intervention: Patient screened for tobacco use and is an ex/non-smoker
Detail Level: Detailed
Quality 130: Documentation Of Current Medications In The Medical Record: Current Medications Documented
Quality 431: Preventive Care And Screening: Unhealthy Alcohol Use - Screening: Patient not identified as an unhealthy alcohol user when screened for unhealthy alcohol use using a systematic screening method
Quality 110: Preventive Care And Screening: Influenza Immunization: Influenza Immunization Administered during Influenza season

## 2022-09-02 ENCOUNTER — HOSPITAL ENCOUNTER (OUTPATIENT)
Dept: LAB | Facility: MEDICAL CENTER | Age: 67
End: 2022-09-02
Attending: FAMILY MEDICINE
Payer: MEDICARE

## 2022-09-02 ENCOUNTER — HOSPITAL ENCOUNTER (OUTPATIENT)
Dept: LAB | Facility: MEDICAL CENTER | Age: 67
End: 2022-09-02
Attending: INTERNAL MEDICINE
Payer: MEDICARE

## 2022-09-02 DIAGNOSIS — I10 PRIMARY HYPERTENSION: ICD-10-CM

## 2022-09-02 DIAGNOSIS — E78.2 MIXED HYPERLIPIDEMIA: ICD-10-CM

## 2022-09-02 LAB
ALBUMIN SERPL BCP-MCNC: 5 G/DL (ref 3.2–4.9)
ALBUMIN/GLOB SERPL: 2.3 G/DL
ALP SERPL-CCNC: 86 U/L (ref 30–99)
ALT SERPL-CCNC: 28 U/L (ref 2–50)
ANION GAP SERPL CALC-SCNC: 13 MMOL/L (ref 7–16)
AST SERPL-CCNC: 28 U/L (ref 12–45)
BASOPHILS # BLD AUTO: 0.4 % (ref 0–1.8)
BASOPHILS # BLD: 0.03 K/UL (ref 0–0.12)
BILIRUB SERPL-MCNC: 1.2 MG/DL (ref 0.1–1.5)
BUN SERPL-MCNC: 13 MG/DL (ref 8–22)
CALCIUM SERPL-MCNC: 9.2 MG/DL (ref 8.5–10.5)
CHLORIDE SERPL-SCNC: 101 MMOL/L (ref 96–112)
CHOLEST SERPL-MCNC: 153 MG/DL (ref 100–199)
CO2 SERPL-SCNC: 24 MMOL/L (ref 20–33)
CREAT SERPL-MCNC: 0.96 MG/DL (ref 0.5–1.4)
CREAT UR-MCNC: 59.48 MG/DL
EOSINOPHIL # BLD AUTO: 0.39 K/UL (ref 0–0.51)
EOSINOPHIL NFR BLD: 5.7 % (ref 0–6.9)
ERYTHROCYTE [DISTWIDTH] IN BLOOD BY AUTOMATED COUNT: 40 FL (ref 35.9–50)
FASTING STATUS PATIENT QL REPORTED: NORMAL
GFR SERPLBLD CREATININE-BSD FMLA CKD-EPI: 86 ML/MIN/1.73 M 2
GLOBULIN SER CALC-MCNC: 2.2 G/DL (ref 1.9–3.5)
GLUCOSE SERPL-MCNC: 93 MG/DL (ref 65–99)
HCT VFR BLD AUTO: 48.9 % (ref 42–52)
HDLC SERPL-MCNC: 52 MG/DL
HGB BLD-MCNC: 17.3 G/DL (ref 14–18)
IMM GRANULOCYTES # BLD AUTO: 0.03 K/UL (ref 0–0.11)
IMM GRANULOCYTES NFR BLD AUTO: 0.4 % (ref 0–0.9)
LDLC SERPL CALC-MCNC: 73 MG/DL
LYMPHOCYTES # BLD AUTO: 1.22 K/UL (ref 1–4.8)
LYMPHOCYTES NFR BLD: 17.7 % (ref 22–41)
MCH RBC QN AUTO: 32 PG (ref 27–33)
MCHC RBC AUTO-ENTMCNC: 35.4 G/DL (ref 33.7–35.3)
MCV RBC AUTO: 90.4 FL (ref 81.4–97.8)
MICROALBUMIN UR-MCNC: <1.2 MG/DL
MICROALBUMIN/CREAT UR: NORMAL MG/G (ref 0–30)
MONOCYTES # BLD AUTO: 0.56 K/UL (ref 0–0.85)
MONOCYTES NFR BLD AUTO: 8.1 % (ref 0–13.4)
NEUTROPHILS # BLD AUTO: 4.65 K/UL (ref 1.82–7.42)
NEUTROPHILS NFR BLD: 67.7 % (ref 44–72)
NRBC # BLD AUTO: 0 K/UL
NRBC BLD-RTO: 0 /100 WBC
PLATELET # BLD AUTO: 256 K/UL (ref 164–446)
PMV BLD AUTO: 10.7 FL (ref 9–12.9)
POTASSIUM SERPL-SCNC: 4 MMOL/L (ref 3.6–5.5)
PROT SERPL-MCNC: 7.2 G/DL (ref 6–8.2)
RBC # BLD AUTO: 5.41 M/UL (ref 4.7–6.1)
SODIUM SERPL-SCNC: 138 MMOL/L (ref 135–145)
TRIGL SERPL-MCNC: 139 MG/DL (ref 0–149)
TSH SERPL DL<=0.005 MIU/L-ACNC: 3 UIU/ML (ref 0.38–5.33)
WBC # BLD AUTO: 6.9 K/UL (ref 4.8–10.8)

## 2022-09-02 PROCEDURE — 80053 COMPREHEN METABOLIC PANEL: CPT

## 2022-09-02 PROCEDURE — 82043 UR ALBUMIN QUANTITATIVE: CPT

## 2022-09-02 PROCEDURE — 82172 ASSAY OF APOLIPOPROTEIN: CPT

## 2022-09-02 PROCEDURE — 36415 COLL VENOUS BLD VENIPUNCTURE: CPT

## 2022-09-02 PROCEDURE — 80061 LIPID PANEL: CPT

## 2022-09-02 PROCEDURE — 84443 ASSAY THYROID STIM HORMONE: CPT

## 2022-09-02 PROCEDURE — 82570 ASSAY OF URINE CREATININE: CPT

## 2022-09-02 PROCEDURE — 83695 ASSAY OF LIPOPROTEIN(A): CPT

## 2022-09-02 PROCEDURE — 85025 COMPLETE CBC W/AUTO DIFF WBC: CPT

## 2022-09-04 LAB — APO B100 SERPL-MCNC: 80 MG/DL (ref 55–140)

## 2022-09-06 LAB — LPA SERPL-MCNC: 21 MG/DL

## 2022-09-08 ENCOUNTER — OFFICE VISIT (OUTPATIENT)
Dept: VASCULAR LAB | Facility: MEDICAL CENTER | Age: 67
End: 2022-09-08
Attending: FAMILY MEDICINE
Payer: MEDICARE

## 2022-09-08 VITALS
HEIGHT: 69 IN | BODY MASS INDEX: 27.08 KG/M2 | SYSTOLIC BLOOD PRESSURE: 129 MMHG | DIASTOLIC BLOOD PRESSURE: 76 MMHG | HEART RATE: 54 BPM | WEIGHT: 182.8 LBS

## 2022-09-08 DIAGNOSIS — I10 PRIMARY HYPERTENSION: ICD-10-CM

## 2022-09-08 DIAGNOSIS — I70.0 ABDOMINAL AORTIC ATHEROSCLEROSIS (HCC): ICD-10-CM

## 2022-09-08 DIAGNOSIS — E78.2 MIXED HYPERLIPIDEMIA: ICD-10-CM

## 2022-09-08 DIAGNOSIS — Z79.02 LONG TERM (CURRENT) USE OF ANTITHROMBOTICS/ANTIPLATELETS: ICD-10-CM

## 2022-09-08 DIAGNOSIS — R93.1 AGATSTON CAC SCORE 200-399: ICD-10-CM

## 2022-09-08 DIAGNOSIS — I25.10 CORONARY ARTERY CALCIFICATION SEEN ON CT SCAN: ICD-10-CM

## 2022-09-08 PROCEDURE — 99212 OFFICE O/P EST SF 10 MIN: CPT

## 2022-09-08 PROCEDURE — 99214 OFFICE O/P EST MOD 30 MIN: CPT | Performed by: FAMILY MEDICINE

## 2022-09-08 ASSESSMENT — ENCOUNTER SYMPTOMS
NAUSEA: 0
BRUISES/BLEEDS EASILY: 0
COUGH: 0
FEVER: 0
MYALGIAS: 0
WEAKNESS: 0
CHILLS: 0
CLAUDICATION: 0
PALPITATIONS: 0
ORTHOPNEA: 0

## 2022-09-08 ASSESSMENT — FIBROSIS 4 INDEX: FIB4 SCORE: 1.38

## 2022-09-08 NOTE — PROGRESS NOTES
RESISTANT HTN CLINIC - follow-up,  est 22    Mahad Vyas is a male seen for evaluation of resistant HTN and management.   Mahad Vyas is initially referred by Severino Beaulieu M.D.     Subjective    HTN:  Current/interval HTN concerns:  feeling well  Home BP los/70-80s   Adherence to current HTN meds: compliant all of the time   Lifestyle factors:  Weight Change: stable   BMI Readings from Last 3 Encounters:   22 26.99 kg/m²   22 27.32 kg/m²   22 27.27 kg/m²      Diet pattern: common adult  Daily salt intake estimate:  Low     EtOH: Yes, Details: social, usu <2/day   Exercise habits:  10k steps/day most days,  gym 4x/week  Perceived barriers to care: none     Hyperlipidemia:  Reports lengthy hx of elevated LDL and trigs, no prior meds.  Had CAC per PCP but has not have interpreted to date.   Current treatment: lifestyle changes  and Moderate intensity   BASELINE LIPID:   Latest Reference Range & Units 22 08:45   Cholesterol,Tot 100 - 199 mg/dL 238 (H)   Triglycerides 0 - 149 mg/dL 199 (H)   HDL >=40 mg/dL 52   LDL <100 mg/dL 146 (H)     Antiplatelet/anticoagulation: No     Hypothyroidism:  Stable, no issues. Energy level adequate  Tolerating meds, no dosage changes   Last TSH in normal range.       Current Outpatient Medications:     omeprazole, , Taking    rosuvastatin, 10 mg, Oral, Q EVENING, Taking    Telmisartan-amLODIPine, 1 Tablet, Oral, QHS, Taking    aspirin, 81 mg, Oral, DAILY, Taking    traZODone, 50 mg, Oral, HS PRN, PRN    triamcinolone acetonide, PLEASE SEE ATTACHED FOR DETAILED DIRECTIONS, Taking    vitamin D, 1,000 Units, Oral, DAILY, Taking    azelastine, 1 Spray, Nasal, BID, Taking    levothyroxine, 50 mcg, Oral, AM ES, Taking    fluticasone, 1 Spray, Nasal, DAILY, Taking    diphenoxylate-atropine, 1 Tablet, Oral, 4X/DAY PRN, PRN    sildenafil citrate, 100 mg, Oral, PRN, PRN     Social History     Tobacco Use    Smoking status:  "Never    Smokeless tobacco: Never   Vaping Use    Vaping Use: Never used   Substance Use Topics    Alcohol use: Yes     Alcohol/week: 4.2 - 6.0 oz     Types: 7 - 10 Cans of beer per week    Drug use: Yes     Types: Marijuana     Comment: Edibles      Review of Systems   Constitutional:  Negative for chills and fever.   Respiratory:  Negative for cough.    Cardiovascular:  Negative for chest pain, palpitations, orthopnea, claudication and leg swelling.   Gastrointestinal:  Negative for nausea.   Musculoskeletal:  Negative for myalgias.   Neurological:  Negative for weakness.   Endo/Heme/Allergies:  Does not bruise/bleed easily.         Objective    Vitals:    09/08/22 1403 09/08/22 1406   BP: 135/65 129/76   BP Location: Left arm Left arm   Patient Position: Sitting Sitting   BP Cuff Size: Adult Adult   Pulse: (!) 55 (!) 54   Weight: 82.9 kg (182 lb 12.8 oz)    Height: 1.753 m (5' 9\")      Body mass index is 26.99 kg/m².  BP Readings from Last 5 Encounters:   09/08/22 129/76   07/08/22 132/79   05/04/22 134/82   03/28/22 110/72   01/31/22 128/72      Physical Exam  Vitals reviewed.   Constitutional:       General: He is not in acute distress.     Appearance: He is well-developed. He is not diaphoretic.   HENT:      Head: Normocephalic and atraumatic.   Neck:      Thyroid: No thyromegaly.      Vascular: No JVD.   Cardiovascular:      Rate and Rhythm: Normal rate and regular rhythm.      Pulses: Normal pulses.      Heart sounds: No murmur heard.  Pulmonary:      Effort: No respiratory distress.      Breath sounds: Normal breath sounds. No wheezing or rales.   Musculoskeletal:         General: No swelling or tenderness.   Neurological:      General: No focal deficit present.      Mental Status: He is oriented to person, place, and time.      Cranial Nerves: No cranial nerve deficit.      Coordination: Coordination normal.   Psychiatric:         Mood and Affect: Mood normal.         Behavior: Behavior normal.      " Accessory Clinical Findings:     Lab Results   Component Value Date    CHOLSTRLTOT 153 09/02/2022    LDL 73 09/02/2022    HDL 52 09/02/2022    TRIGLYCERIDE 139 09/02/2022      Lab Results   Component Value Date/Time    LIPOPROTA 21 09/02/2022 10:19 AM      Lab Results   Component Value Date/Time    APOB 80 09/02/2022 10:19 AM            Lab Results   Component Value Date    HBA1C 5.4 02/03/2021      Lab Results   Component Value Date    SODIUM 138 09/02/2022    POTASSIUM 4.0 09/02/2022    CHLORIDE 101 09/02/2022    CO2 24 09/02/2022    GLUCOSE 93 09/02/2022    BUN 13 09/02/2022    CREATININE 0.96 09/02/2022    BUNCREATRAT 11 02/03/2021          Lab Results   Component Value Date    URCREAT 59.48 09/02/2022    MICROALBUR <1.2 09/02/2022    MALBCRT see below 09/02/2022     Lab Results   Component Value Date/Time    MALBCRT see below 09/02/2022 10:19 AM    MICROALBUR <1.2 09/02/2022 10:19 AM      VASCULAR IMAGING:     Last EKG: No results found for this or any previous visit.    LLE venous 11/2019   Status post ablation of the LEFT greater saphenous vein with no flow    demonstrated.   No evidence of deep venous thrombosis.     No prior duplex is available for comparison.    CAC 4/2022   Coronary calcification:  LMA - 0.0  LCX - 129.5  LAD - 164.7  RCA - 16.5  PDA - 0.0   Total Calcium Score: 310.7   Percentile: Calcium score is above the 50th percentile for the patient's age and sex.   Other findings:  Heart: Normal size.  Lungs: Clear.  Mediastinum: Normal.  Upper abdomen: Small hiatal hernia.    CT abd/pelvis 4/2022   Adrenal glands: Subtle nodularity of the adrenal glands without a discrete nodule identified.   Vasculature: Nonaneurysmal aorta. Mild scattered arterial calcifications.  1.  No acute intra-abdominal findings   2.  Diverticulosis.   3.  Atherosclerosis            MEDICAL DECISION-MAKING:  TODAY'S ASSESSMENT / STATUS / PLAN:  1. Mixed hyperlipidemia  Comp Metabolic Panel    APOLIPOPROTEIN B    Lipid  Profile      2. Primary hypertension  Comp Metabolic Panel    APOLIPOPROTEIN B    Lipid Profile      3. Agatston CAC score 200-399        4. Coronary artery calcification seen on CT scan        5. Abdominal aortic atherosclerosis (HCC)        6. Long term (current) use of antithrombotics/antiplatelets             Patient Type: Primary Prevention    1. BLOOD PRESSURE CONTROL   Qualifies as Resistant HTN (RH):  No, BP at goal with <3 meds   Office BP Goal ACC/AHA (2017) goal <130/80  Home BP at goal:  yes  Office BP at goal:  yes  24h ABPM:  not ordered to date   Device candidate? Not available at this time   Contributing factors: stress   Plan:   Monitoring:   - start/continue home BP monitoring, reviewed correct technique:  Yes   - order 24h ABPM: NO  - monitor lytes/gfr routinely     2. WORK UP FOR SECONDARY CAUSES OF RH:  Obstructive Sleep Apnea: Not Yet Assessed  Renal parenchymal disease: Excluded  Renovascular HTN: Not Yet Assessed  Primary Aldosteronism: Not Yet Assessed  Thyroid Function: stable TSH on levothyroxine   Pheochromocytoma: Not Yet Assessed   Cushing's:   Not Yet Assessed   Coarctation of Aorta: excluded  Other: none identified    Recommendations At This Visit: as noted in orders         3. MEDICATION USE / ADHERENCE:   Assessment: Complete  Recommendations: Instructed to Continue Taking All Medications As Prescribed         4. HTN-MEDIATED END-ORGAN DAMAGE:  Left Ventricular Hypertrophy: Not Assessed on   one   Date: n/a   Urine Albuminuria: Not Yet Assessed on Date: pending   Renal Function: Chronic Kidney Disease  CKD G2 at worst   Ophthalmologic: Absent per patient report and/or eye specialist   Established Cardiovascular Disease: None    5. LIFESTYLE INTERVENTIONS:    SMOKING:   reports that he has never smoked. He has never used smokeless tobacco.   - continued complete avoidance of all tobacco products     PHYSICAL ACTIVITY: continue healthy activity to improve CV fitness.  In general,  "targeting >150min/week of moderate-level activity.   Continue excellent activity levels     WEIGHT MANAGEMENT AND NUTRITION: Mediterranean style dietary approach , Triglyceride-lowering diet with reduced fat calories, reduced simple carbohydrates, reduce/avoid alcohol and Provide specific dietary approach handouts    6. STANDARD HTN PHARMACOTHERAPY:  - continue telmisartan-amlodipine 80/10mg daily  Thiazide Type Diuretic: stopped HCTZ due to potential cause of ED - monitor response of BP and ED     7. ADDITIONAL AGENTS   -none at this time     LIPID MANAGEMENT:   Qualifies for Statin Therapy Based on 2018 ACC/AHA Guidelines: yes, Primary Prevention - 40-76yo, LDLc >70, <190 w/o DM  The 10-year ASCVD risk score ([place] DE LOS SANTOS Jr., et al., 2013) is: 14.4%, >20% \"high risk\"    TAVARES risk with CAC score = The estimated 10-year risk of a CHD event for a person with this risk factor profile including coronary calcium is 16.1%. The estimated 10-year risk of a CHD event for a person with this risk factor profile if we did not factor in their coronary calcium score would be 11.3%.  - TAVARES - 70% for age/gender   Major ASCVD events: CAD and/or CAC >300    High-risk conditions: N/A, >64yr old  and Hypertension   Risk-enhancers: Persistently elevated trigs >174   Reviewed risks/benefits/alternatives for lipid-lowering therapies, and through shared decision-making we have opted to initiate statin therapy to lower ASCVD risk.    Currently on Statin: started   Tx goals: LDL-C <100 (consider non-HDL-C <130, apoB <90)  At goal? no  Plan:   - reinforced ongoing TLC measures as noted   - monitor labs   Meds:   - continue rosuva 10mg daily     GLYCEMIA MANAGEMENT:  Normal    ANTIPLATELET/ANTICOAGULANT THERAPY:    - continue ASA 81mg daily   PATTI Cardiol (2021): Cohort study, using participants from the [facility] Heart Study cohort.  Among individuals at lower bleeding risk, a CAC score of 100+ identified individuals who would experience net " benefit for aspirin therapy if at borderline or higher (?5%) 10-year ASCVD risk. Thus, a high CAC score identifies individuals estimated to derive net benefit from primary prevention aspirin therapy from those who would not, but only in the setting of lower bleeding risk and estimated ASCVD risk that is not low    OTHER ISSUES none     Studies Ordered At This Visit: none    Blood Work to Be Obtained Prior to Next Visit: as noted below   Disposition: RTC in o      Oh Muir M.D.  [facility]   [facility]   [phone]

## 2022-11-08 ENCOUNTER — PATIENT MESSAGE (OUTPATIENT)
Dept: HEALTH INFORMATION MANAGEMENT | Facility: OTHER | Age: 67
End: 2022-11-08

## 2023-03-03 ENCOUNTER — HOSPITAL ENCOUNTER (OUTPATIENT)
Dept: LAB | Facility: MEDICAL CENTER | Age: 68
End: 2023-03-03
Attending: FAMILY MEDICINE
Payer: MEDICARE

## 2023-03-03 DIAGNOSIS — E78.2 MIXED HYPERLIPIDEMIA: ICD-10-CM

## 2023-03-03 DIAGNOSIS — I10 PRIMARY HYPERTENSION: ICD-10-CM

## 2023-03-03 LAB
ALBUMIN SERPL BCP-MCNC: 4.6 G/DL (ref 3.2–4.9)
ALBUMIN/GLOB SERPL: 1.8 G/DL
ALP SERPL-CCNC: 78 U/L (ref 30–99)
ALT SERPL-CCNC: 27 U/L (ref 2–50)
ANION GAP SERPL CALC-SCNC: 12 MMOL/L (ref 7–16)
AST SERPL-CCNC: 27 U/L (ref 12–45)
BILIRUB SERPL-MCNC: 0.6 MG/DL (ref 0.1–1.5)
BUN SERPL-MCNC: 24 MG/DL (ref 8–22)
CALCIUM ALBUM COR SERPL-MCNC: 8.8 MG/DL (ref 8.5–10.5)
CALCIUM SERPL-MCNC: 9.3 MG/DL (ref 8.5–10.5)
CHLORIDE SERPL-SCNC: 107 MMOL/L (ref 96–112)
CHOLEST SERPL-MCNC: 177 MG/DL (ref 100–199)
CO2 SERPL-SCNC: 24 MMOL/L (ref 20–33)
CREAT SERPL-MCNC: 1.05 MG/DL (ref 0.5–1.4)
FASTING STATUS PATIENT QL REPORTED: NORMAL
GFR SERPLBLD CREATININE-BSD FMLA CKD-EPI: 77 ML/MIN/1.73 M 2
GLOBULIN SER CALC-MCNC: 2.5 G/DL (ref 1.9–3.5)
GLUCOSE SERPL-MCNC: 96 MG/DL (ref 65–99)
HDLC SERPL-MCNC: 48 MG/DL
LDLC SERPL CALC-MCNC: 103 MG/DL
POTASSIUM SERPL-SCNC: 4.2 MMOL/L (ref 3.6–5.5)
PROT SERPL-MCNC: 7.1 G/DL (ref 6–8.2)
SODIUM SERPL-SCNC: 143 MMOL/L (ref 135–145)
TRIGL SERPL-MCNC: 131 MG/DL (ref 0–149)

## 2023-03-03 PROCEDURE — 80061 LIPID PANEL: CPT

## 2023-03-03 PROCEDURE — 36415 COLL VENOUS BLD VENIPUNCTURE: CPT

## 2023-03-03 PROCEDURE — 82172 ASSAY OF APOLIPOPROTEIN: CPT

## 2023-03-03 PROCEDURE — 80053 COMPREHEN METABOLIC PANEL: CPT

## 2023-03-06 ENCOUNTER — APPOINTMENT (RX ONLY)
Dept: URBAN - METROPOLITAN AREA CLINIC 4 | Facility: CLINIC | Age: 68
Setting detail: DERMATOLOGY
End: 2023-03-06

## 2023-03-06 DIAGNOSIS — D18.0 HEMANGIOMA: ICD-10-CM

## 2023-03-06 DIAGNOSIS — D22 MELANOCYTIC NEVI: ICD-10-CM

## 2023-03-06 DIAGNOSIS — L81.4 OTHER MELANIN HYPERPIGMENTATION: ICD-10-CM

## 2023-03-06 DIAGNOSIS — L82.1 OTHER SEBORRHEIC KERATOSIS: ICD-10-CM

## 2023-03-06 PROBLEM — D22.5 MELANOCYTIC NEVI OF TRUNK: Status: ACTIVE | Noted: 2023-03-06

## 2023-03-06 PROBLEM — D18.01 HEMANGIOMA OF SKIN AND SUBCUTANEOUS TISSUE: Status: ACTIVE | Noted: 2023-03-06

## 2023-03-06 LAB — APO B100 SERPL-MCNC: 81 MG/DL (ref 55–140)

## 2023-03-06 PROCEDURE — 99213 OFFICE O/P EST LOW 20 MIN: CPT

## 2023-03-06 PROCEDURE — ? COUNSELING

## 2023-03-06 ASSESSMENT — LOCATION SIMPLE DESCRIPTION DERM
LOCATION SIMPLE: LEFT UPPER BACK
LOCATION SIMPLE: RIGHT UPPER BACK
LOCATION SIMPLE: LEFT CHEEK

## 2023-03-06 ASSESSMENT — LOCATION DETAILED DESCRIPTION DERM
LOCATION DETAILED: LEFT MEDIAL UPPER BACK
LOCATION DETAILED: LEFT SUPERIOR MEDIAL UPPER BACK
LOCATION DETAILED: LEFT CENTRAL MALAR CHEEK
LOCATION DETAILED: RIGHT MEDIAL UPPER BACK

## 2023-03-06 ASSESSMENT — LOCATION ZONE DERM
LOCATION ZONE: FACE
LOCATION ZONE: TRUNK

## 2023-03-09 ENCOUNTER — OFFICE VISIT (OUTPATIENT)
Dept: VASCULAR LAB | Facility: MEDICAL CENTER | Age: 68
End: 2023-03-09
Attending: FAMILY MEDICINE
Payer: MEDICARE

## 2023-03-09 VITALS
HEIGHT: 69 IN | WEIGHT: 189 LBS | DIASTOLIC BLOOD PRESSURE: 73 MMHG | SYSTOLIC BLOOD PRESSURE: 142 MMHG | HEART RATE: 52 BPM | BODY MASS INDEX: 27.99 KG/M2

## 2023-03-09 DIAGNOSIS — I70.0 ABDOMINAL AORTIC ATHEROSCLEROSIS (HCC): ICD-10-CM

## 2023-03-09 DIAGNOSIS — R93.1 AGATSTON CAC SCORE 200-399: ICD-10-CM

## 2023-03-09 DIAGNOSIS — E78.2 MIXED HYPERLIPIDEMIA: ICD-10-CM

## 2023-03-09 DIAGNOSIS — I25.10 CORONARY ARTERY CALCIFICATION SEEN ON CT SCAN: ICD-10-CM

## 2023-03-09 DIAGNOSIS — Z79.02 LONG TERM (CURRENT) USE OF ANTITHROMBOTICS/ANTIPLATELETS: ICD-10-CM

## 2023-03-09 DIAGNOSIS — I10 PRIMARY HYPERTENSION: ICD-10-CM

## 2023-03-09 PROBLEM — H91.90 HEARING LOSS: Status: ACTIVE | Noted: 2023-03-09

## 2023-03-09 PROCEDURE — 99212 OFFICE O/P EST SF 10 MIN: CPT

## 2023-03-09 PROCEDURE — 99214 OFFICE O/P EST MOD 30 MIN: CPT | Performed by: FAMILY MEDICINE

## 2023-03-09 RX ORDER — ASPIRIN 81 MG/1
81 TABLET, CHEWABLE ORAL DAILY
Qty: 100 TABLET | COMMUNITY
Start: 2023-03-09

## 2023-03-09 RX ORDER — ROSUVASTATIN CALCIUM 20 MG/1
20 TABLET, COATED ORAL
Qty: 45 TABLET | Refills: 3 | Status: SHIPPED | OUTPATIENT
Start: 2023-03-09 | End: 2023-12-22

## 2023-03-09 RX ORDER — EZETIMIBE 10 MG/1
10 TABLET ORAL DAILY
Qty: 90 TABLET | Refills: 3 | Status: SHIPPED | OUTPATIENT
Start: 2023-03-09

## 2023-03-09 ASSESSMENT — ENCOUNTER SYMPTOMS
BRUISES/BLEEDS EASILY: 0
PALPITATIONS: 0
ORTHOPNEA: 0
NAUSEA: 0
COUGH: 0
CHILLS: 0
FEVER: 0
WEAKNESS: 0
MYALGIAS: 0
CLAUDICATION: 0

## 2023-03-09 ASSESSMENT — FIBROSIS 4 INDEX: FIB4 SCORE: 1.38

## 2023-03-09 NOTE — PROGRESS NOTES
RESISTANT HTN CLINIC - FOLLOW-UP  22    Mahad Vyas is a male seen for evaluation of resistant HTN and management.   Mahad Vyas is initially referred by Severino Beaulieu M.D, est 2022     Subjective      Current/interval HTN concerns:  last seen 2022, feeling well, tolerating meds.  No new issues     HTN:  Home BP los/70-80s   Adherence to current HTN meds: compliant all of the time   Lifestyle factors:  Weight Change: stable   BMI Readings from Last 3 Encounters:   23 27.91 kg/m²   22 26.99 kg/m²   22 27.32 kg/m²      Diet pattern: common adult  Daily salt intake estimate:  Low     EtOH: Yes, Details: social, usu <2/day   Exercise habits:  10k steps/day most days,  gym 4x/week  Perceived barriers to care: none     Hyperlipidemia:  Tolerating    Current treatment: lifestyle changes  and Moderate intensity   BASELINE LIPID:   Latest Reference Range & Units 22 08:45   Cholesterol,Tot 100 - 199 mg/dL 238 (H)   Triglycerides 0 - 149 mg/dL 199 (H)   HDL >=40 mg/dL 52   LDL <100 mg/dL 146 (H)     Antiplatelet/anticoagulation: No       Current Outpatient Medications:     Fexofenadine-Pseudoephedrine (ALLEGRA-D 24 HOUR PO), 1 Tablet, Oral, QDAY PRN, Taking    omeprazole, , Taking    rosuvastatin, 10 mg, Oral, Q EVENING, Taking    Telmisartan-amLODIPine, 1 Tablet, Oral, QHS, Taking    traZODone, 50 mg, Oral, HS PRN, PRN    vitamin D, 1,000 Units, Oral, DAILY, Taking    azelastine, 1 Spray, Nasal, BID, PRN    levothyroxine, 50 mcg, Oral, AM ES, Taking    fluticasone, 1 Spray, Nasal, DAILY, Taking    diphenoxylate-atropine, 1 Tablet, Oral, 4X/DAY PRN, PRN    sildenafil citrate, 100 mg, Oral, PRN, PRN    aspirin, 81 mg, Oral, DAILY (Patient not taking: Reported on 3/9/2023), Not Taking     Social History     Tobacco Use    Smoking status: Never    Smokeless tobacco: Never   Vaping Use    Vaping Use: Never used   Substance Use Topics    Alcohol use: Yes      "Alcohol/week: 4.2 - 6.0 oz     Types: 7 - 10 Cans of beer per week    Drug use: Yes     Types: Marijuana     Comment: Edibles      Review of Systems   Constitutional:  Negative for chills and fever.   Respiratory:  Negative for cough.    Cardiovascular:  Negative for chest pain, palpitations, orthopnea, claudication and leg swelling.   Gastrointestinal:  Negative for nausea.   Musculoskeletal:  Negative for myalgias.   Neurological:  Negative for weakness.   Endo/Heme/Allergies:  Does not bruise/bleed easily.         Objective    Vitals:    03/09/23 1358 03/09/23 1400   BP: 133/74 (!) 142/73   BP Location: Left arm Left arm   Patient Position: Sitting Sitting   BP Cuff Size: Adult Adult   Pulse: (!) 55 (!) 52   Weight: 85.7 kg (189 lb)    Height: 1.753 m (5' 9\")      Body mass index is 27.91 kg/m².  BP Readings from Last 5 Encounters:   03/09/23 (!) 142/73   09/08/22 129/76   07/08/22 132/79   05/04/22 134/82   03/28/22 110/72      Physical Exam  Vitals reviewed.   Constitutional:       General: He is not in acute distress.     Appearance: He is well-developed. He is not diaphoretic.   HENT:      Head: Normocephalic and atraumatic.   Neck:      Thyroid: No thyromegaly.      Vascular: No JVD.   Cardiovascular:      Rate and Rhythm: Normal rate and regular rhythm.      Pulses: Normal pulses.      Heart sounds: No murmur heard.  Pulmonary:      Effort: No respiratory distress.      Breath sounds: Normal breath sounds. No wheezing or rales.   Musculoskeletal:         General: No swelling or tenderness.   Neurological:      General: No focal deficit present.      Mental Status: He is oriented to person, place, and time.      Cranial Nerves: No cranial nerve deficit.      Coordination: Coordination normal.   Psychiatric:         Mood and Affect: Mood normal.         Behavior: Behavior normal.      Accessory Clinical Findings:     Lab Results   Component Value Date    CHOLSTRLTOT 177 03/03/2023     (H) 03/03/2023    " HDL 48 03/03/2023    TRIGLYCERIDE 131 03/03/2023      Lab Results   Component Value Date/Time    LIPOPROTA 21 09/02/2022 10:19 AM      Lab Results   Component Value Date/Time    APOB 81 03/03/2023 07:17 AM                  Lab Results   Component Value Date    SODIUM 143 03/03/2023    POTASSIUM 4.2 03/03/2023    CHLORIDE 107 03/03/2023    CO2 24 03/03/2023    GLUCOSE 96 03/03/2023    BUN 24 (H) 03/03/2023    CREATININE 1.05 03/03/2023    BUNCREATRAT 13.0 11/04/2021          Lab Results   Component Value Date    URCREAT 59.48 09/02/2022    MICROALBUR <1.2 09/02/2022    MALBCRT see below 09/02/2022     Lab Results   Component Value Date/Time    MALBCRT see below 09/02/2022 10:19 AM    MICROALBUR <1.2 09/02/2022 10:19 AM      VASCULAR IMAGING:     Last EKG: No results found for this or any previous visit.    LLE venous 11/2019   Status post ablation of the LEFT greater saphenous vein with no flow    demonstrated.   No evidence of deep venous thrombosis.     No prior duplex is available for comparison.    CAC 4/2022   Coronary calcification:  LMA - 0.0  LCX - 129.5  LAD - 164.7  RCA - 16.5  PDA - 0.0   Total Calcium Score: 310.7   Percentile: Calcium score is above the 50th percentile for the patient's age and sex.   Other findings:  Heart: Normal size.  Lungs: Clear.  Mediastinum: Normal.  Upper abdomen: Small hiatal hernia.    CT abd/pelvis 4/2022   Adrenal glands: Subtle nodularity of the adrenal glands without a discrete nodule identified.   Vasculature: Nonaneurysmal aorta. Mild scattered arterial calcifications.  1.  No acute intra-abdominal findings   2.  Diverticulosis.   3.  Atherosclerosis            MEDICAL DECISION-MAKING:  TODAY'S ASSESSMENT / STATUS / PLAN:  1. Primary hypertension        2. Mixed hyperlipidemia        3. Coronary artery calcification seen on CT scan        4. Agatston CAC score 200-399        5. Abdominal aortic atherosclerosis (HCC)        6. Long term (current) use of  antithrombotics/antiplatelets               Patient Type: Primary Prevention    1. BLOOD PRESSURE CONTROL   Qualifies as Resistant HTN (RH):  No, BP at goal with <3 meds   Office BP Goal ACC/AHA (2017) goal <130/80  Home BP at goal:  yes  Office BP at goal:  yes  24h ABPM:  not ordered to date   Device candidate? Not available at this time   Contributing factors: stress   Plan:   Monitoring:   - start/continue home BP monitoring, reviewed correct technique:  Yes   - order 24h ABPM: NO  - monitor lytes/gfr routinely     2. WORK UP FOR SECONDARY CAUSES OF RH:  Obstructive Sleep Apnea: Not Yet Assessed  Renal parenchymal disease: Excluded  Renovascular HTN: Not Yet Assessed  Primary Aldosteronism: Not Yet Assessed  Thyroid Function: stable TSH on levothyroxine   Pheochromocytoma: Not Yet Assessed   Cushing's:   Not Yet Assessed   Coarctation of Aorta: excluded  Other: none identified    Recommendations At This Visit: as noted in orders         3. MEDICATION USE / ADHERENCE:   Assessment: Complete  Recommendations: Instructed to Continue Taking All Medications As Prescribed         4. HTN-MEDIATED END-ORGAN DAMAGE:  Left Ventricular Hypertrophy: Not Assessed on   one   Date: n/a   Urine Albuminuria: None on Date: 2022  Renal Function: Chronic Kidney Disease  CKD G2 at worst   Ophthalmologic: Absent per patient report and/or eye specialist     Established Cardiovascular Disease:     # CAD, evidenced by CACS = 310   - continue med mgmt, including statin and ASA     5. LIFESTYLE INTERVENTIONS:    SMOKING:   reports that he has never smoked. He has never used smokeless tobacco.   - continued complete avoidance of all tobacco products     PHYSICAL ACTIVITY: continue healthy activity to improve CV fitness.  In general, targeting >150min/week of moderate-level activity.   Continue excellent activity levels     WEIGHT MANAGEMENT AND NUTRITION: Mediterranean style dietary approach , Triglyceride-lowering diet with reduced fat  "calories, reduced simple carbohydrates, reduce/avoid alcohol and Provide specific dietary approach handouts    6. STANDARD HTN PHARMACOTHERAPY:  - continue telmisartan-amlodipine 80/10mg daily  Thiazide Type Diuretic: stopped HCTZ due to potential cause of ED - monitor response of BP and ED     7. ADDITIONAL AGENTS   -none at this time     LIPID MANAGEMENT:   Qualifies for Statin Therapy Based on 2018 ACC/AHA Guidelines: yes, Primary Prevention - 40-76yo, LDLc >70, <190 w/o DM  The 10-year ASCVD risk score (Carlton HASKINS, et al., 2019) is: 20.6%, >20% \"high risk\"    TAVARES risk with CAC score = The estimated 10-year risk of a CHD event for a person with this risk factor profile including coronary calcium is 16.1%. The estimated 10-year risk of a CHD event for a person with this risk factor profile if we did not factor in their coronary calcium score would be 11.3%.  - TAVARES - 70% for age/gender   Major ASCVD events: CAD and/or CAC >300    High-risk conditions: N/A, >64yr old  and Hypertension   Risk-enhancers: Persistently elevated trigs >174   Reviewed risks/benefits/alternatives for lipid-lowering therapies, and through shared decision-making we have opted to initiate statin therapy to lower ASCVD risk.    Currently on Statin: started   Tx goals: LDL-C <70 (if HTG, consider apoB<80, non-HDL-C <100) due CACS >100  At goal? No, 2/2023  Plan:   - reinforced ongoing TLC measures as noted   - monitor labs   Meds:   - partial intolerance to rosuva 10mg,  switch to rosuva 20mg QOD - monitor closely, consider alternative statin if sx   - start zetia 10mg     GLYCEMIA MANAGEMENT:  Normal    ANTIPLATELET/ANTICOAGULANT THERAPY:    - restart ASA 81mg daily   - benefits > risks, no risk for bleeding   PATTI Cardiol (2021): Ketan heart, CACS >100, high risk primary prevention and recommended for ASA    Value of Coronary Artery Calcium Scanning in Association With the Net Benefit of Aspirin in Primary Prevention of Atherosclerotic " Cardiovascular Disease. PATTI Cardiol 2020;Oct 28:[Epub ahead of print]:   Higher CAC is associated with both ASCVD and bleeding events, with a stronger association with ASCVD. A high CAC score identifies individuals estimated to derive net benefit from primary prevention aspirin therapy from those who would not, but only in the setting of lower bleeding risk and estimated ASCVD risk that is not low.    OTHER ISSUES none     Studies Ordered At This Visit: CACS 2025   Blood Work to Be Obtained Prior to Next Visit: as noted below   Disposition: 6mo       Oh Muir M.D.  [facility]   [facility]   [phone]

## 2023-05-03 ENCOUNTER — HOSPITAL ENCOUNTER (OUTPATIENT)
Facility: MEDICAL CENTER | Age: 68
End: 2023-05-03
Attending: OTOLARYNGOLOGY
Payer: MEDICARE

## 2023-05-03 LAB
GRAM STN SPEC: NORMAL
SIGNIFICANT IND 70042: NORMAL
SITE SITE: NORMAL
SOURCE SOURCE: NORMAL

## 2023-05-03 PROCEDURE — 87077 CULTURE AEROBIC IDENTIFY: CPT

## 2023-05-03 PROCEDURE — 87075 CULTR BACTERIA EXCEPT BLOOD: CPT

## 2023-05-03 PROCEDURE — 87070 CULTURE OTHR SPECIMN AEROBIC: CPT

## 2023-05-03 PROCEDURE — 87186 SC STD MICRODIL/AGAR DIL: CPT

## 2023-05-03 PROCEDURE — 87205 SMEAR GRAM STAIN: CPT

## 2023-09-05 ENCOUNTER — HOSPITAL ENCOUNTER (OUTPATIENT)
Dept: LAB | Facility: MEDICAL CENTER | Age: 68
End: 2023-09-05
Attending: FAMILY MEDICINE
Payer: MEDICARE

## 2023-09-05 ENCOUNTER — HOSPITAL ENCOUNTER (OUTPATIENT)
Dept: LAB | Facility: MEDICAL CENTER | Age: 68
End: 2023-09-05
Attending: INTERNAL MEDICINE
Payer: MEDICARE

## 2023-09-05 DIAGNOSIS — I10 PRIMARY HYPERTENSION: ICD-10-CM

## 2023-09-05 DIAGNOSIS — E78.2 MIXED HYPERLIPIDEMIA: ICD-10-CM

## 2023-09-05 LAB
ALBUMIN SERPL BCP-MCNC: 4.6 G/DL (ref 3.2–4.9)
ALBUMIN/GLOB SERPL: 1.9 G/DL
ALP SERPL-CCNC: 81 U/L (ref 30–99)
ALT SERPL-CCNC: 24 U/L (ref 2–50)
ANION GAP SERPL CALC-SCNC: 10 MMOL/L (ref 7–16)
AST SERPL-CCNC: 19 U/L (ref 12–45)
BILIRUB SERPL-MCNC: 0.6 MG/DL (ref 0.1–1.5)
BUN SERPL-MCNC: 18 MG/DL (ref 8–22)
CALCIUM ALBUM COR SERPL-MCNC: 9 MG/DL (ref 8.5–10.5)
CALCIUM SERPL-MCNC: 9.5 MG/DL (ref 8.5–10.5)
CHLORIDE SERPL-SCNC: 105 MMOL/L (ref 96–112)
CHOLEST SERPL-MCNC: 171 MG/DL (ref 100–199)
CO2 SERPL-SCNC: 27 MMOL/L (ref 20–33)
CREAT SERPL-MCNC: 1.04 MG/DL (ref 0.5–1.4)
ERYTHROCYTE [DISTWIDTH] IN BLOOD BY AUTOMATED COUNT: 43.8 FL (ref 35.9–50)
FASTING STATUS PATIENT QL REPORTED: NORMAL
GFR SERPLBLD CREATININE-BSD FMLA CKD-EPI: 78 ML/MIN/1.73 M 2
GLOBULIN SER CALC-MCNC: 2.4 G/DL (ref 1.9–3.5)
GLUCOSE SERPL-MCNC: 101 MG/DL (ref 65–99)
HCT VFR BLD AUTO: 50.8 % (ref 42–52)
HDLC SERPL-MCNC: 48 MG/DL
HGB BLD-MCNC: 16.9 G/DL (ref 14–18)
LDLC SERPL CALC-MCNC: 100 MG/DL
MCH RBC QN AUTO: 31.2 PG (ref 27–33)
MCHC RBC AUTO-ENTMCNC: 33.3 G/DL (ref 32.3–36.5)
MCV RBC AUTO: 93.7 FL (ref 81.4–97.8)
PLATELET # BLD AUTO: 269 K/UL (ref 164–446)
PMV BLD AUTO: 10.8 FL (ref 9–12.9)
POTASSIUM SERPL-SCNC: 4.9 MMOL/L (ref 3.6–5.5)
PROT SERPL-MCNC: 7 G/DL (ref 6–8.2)
PSA SERPL-MCNC: 1.42 NG/ML (ref 0–4)
RBC # BLD AUTO: 5.42 M/UL (ref 4.7–6.1)
SODIUM SERPL-SCNC: 142 MMOL/L (ref 135–145)
TRIGL SERPL-MCNC: 117 MG/DL (ref 0–149)
TSH SERPL DL<=0.005 MIU/L-ACNC: 3.41 UIU/ML (ref 0.38–5.33)
WBC # BLD AUTO: 8.7 K/UL (ref 4.8–10.8)

## 2023-09-05 PROCEDURE — 80061 LIPID PANEL: CPT

## 2023-09-05 PROCEDURE — 36415 COLL VENOUS BLD VENIPUNCTURE: CPT

## 2023-09-05 PROCEDURE — 84153 ASSAY OF PSA TOTAL: CPT | Mod: GA

## 2023-09-05 PROCEDURE — 80053 COMPREHEN METABOLIC PANEL: CPT

## 2023-09-05 PROCEDURE — 85027 COMPLETE CBC AUTOMATED: CPT

## 2023-09-05 PROCEDURE — 84443 ASSAY THYROID STIM HORMONE: CPT

## 2023-09-07 ENCOUNTER — OFFICE VISIT (OUTPATIENT)
Dept: VASCULAR LAB | Facility: MEDICAL CENTER | Age: 68
End: 2023-09-07
Attending: FAMILY MEDICINE
Payer: MEDICARE

## 2023-09-07 VITALS
HEART RATE: 56 BPM | WEIGHT: 182 LBS | HEIGHT: 69 IN | BODY MASS INDEX: 26.96 KG/M2 | DIASTOLIC BLOOD PRESSURE: 72 MMHG | SYSTOLIC BLOOD PRESSURE: 120 MMHG

## 2023-09-07 DIAGNOSIS — E78.2 MIXED HYPERLIPIDEMIA: ICD-10-CM

## 2023-09-07 DIAGNOSIS — I10 PRIMARY HYPERTENSION: ICD-10-CM

## 2023-09-07 DIAGNOSIS — I25.10 CORONARY ARTERY CALCIFICATION SEEN ON CT SCAN: ICD-10-CM

## 2023-09-07 DIAGNOSIS — Z79.02 LONG TERM (CURRENT) USE OF ANTITHROMBOTICS/ANTIPLATELETS: ICD-10-CM

## 2023-09-07 DIAGNOSIS — R73.03 PREDIABETES: ICD-10-CM

## 2023-09-07 DIAGNOSIS — I70.0 ABDOMINAL AORTIC ATHEROSCLEROSIS (HCC): ICD-10-CM

## 2023-09-07 DIAGNOSIS — R93.1 AGATSTON CAC SCORE 200-399: ICD-10-CM

## 2023-09-07 PROBLEM — H93.13 BILATERAL TINNITUS: Status: ACTIVE | Noted: 2018-07-04

## 2023-09-07 PROBLEM — Z77.122 EXPOSED TO NOISE: Status: ACTIVE | Noted: 2018-07-04

## 2023-09-07 PROCEDURE — 99214 OFFICE O/P EST MOD 30 MIN: CPT | Performed by: FAMILY MEDICINE

## 2023-09-07 PROCEDURE — 99212 OFFICE O/P EST SF 10 MIN: CPT

## 2023-09-07 PROCEDURE — 3074F SYST BP LT 130 MM HG: CPT | Performed by: FAMILY MEDICINE

## 2023-09-07 PROCEDURE — 3078F DIAST BP <80 MM HG: CPT | Performed by: FAMILY MEDICINE

## 2023-09-07 ASSESSMENT — ENCOUNTER SYMPTOMS
COUGH: 0
NAUSEA: 0
ORTHOPNEA: 0
BRUISES/BLEEDS EASILY: 0
FEVER: 0
CHILLS: 0
PALPITATIONS: 0
MYALGIAS: 0
CLAUDICATION: 0
WEAKNESS: 0

## 2023-09-07 ASSESSMENT — FIBROSIS 4 INDEX: FIB4 SCORE: 0.98

## 2023-09-07 NOTE — PROGRESS NOTES
RESISTANT HTN CLINIC - FOLLOW-UP  09/07/23    Mahad Vyas is a male seen for evaluation of resistant HTN and management.   Mahad Vyas is initially referred by Severino Beaulieu M.D, est 7/2022     Subjective      Current/interval HTN concerns:  last seen 3/2023, no changes, tolerating meds     HTN:  Home BP log: not checking, mostly 120s/70s over time   Adherence to current HTN meds: compliant all of the time   Lifestyle factors:  Weight Change: stable   Wt Readings from Last 3 Encounters:   09/07/23 82.6 kg (182 lb)   03/09/23 85.7 kg (189 lb)   09/08/22 82.9 kg (182 lb 12.8 oz)      Diet pattern: common adult  Daily salt intake estimate:  Low     EtOH: Yes, Details: social, usu <2/day   Exercise habits:  10k steps/day most days,  gym 4x/week  Perceived barriers to care: none     Hyperlipidemia:  Tolerating    Current treatment: lifestyle changes  and Moderate intensity   BASELINE LIPID:   Latest Reference Range & Units 01/26/22 08:45   Cholesterol,Tot 100 - 199 mg/dL 238 (H)   Triglycerides 0 - 149 mg/dL 199 (H)   HDL >=40 mg/dL 52   LDL <100 mg/dL 146 (H)     Antiplatelet/anticoagulation: No       Current Outpatient Medications:   •  Fexofenadine-Pseudoephedrine (ALLEGRA-D 24 HOUR PO), 1 Tablet, Oral, QDAY PRN, Taking  •  rosuvastatin, 20 mg, Oral, Q48HRS, Taking  •  ezetimibe, 10 mg, Oral, DAILY, Taking  •  omeprazole, , Taking  •  traZODone, 50 mg, Oral, HS PRN, Taking  •  vitamin D, 1,000 Units, Oral, DAILY, Taking  •  azelastine, 1 Spray, Nasal, BID, Taking  •  levothyroxine, 50 mcg, Oral, AM ES, Taking  •  fluticasone, 1 Spray, Nasal, DAILY, Taking  •  diphenoxylate-atropine, 1 Tablet, Oral, 4X/DAY PRN, Taking  •  sildenafil citrate, 100 mg, Oral, PRN, Taking  •  aspirin, 81 mg, Oral, DAILY     Social History     Tobacco Use   • Smoking status: Never   • Smokeless tobacco: Never   Vaping Use   • Vaping Use: Never used   Substance Use Topics   • Alcohol use: Yes     Alcohol/week:  "4.2 - 6.0 oz     Types: 7 - 10 Cans of beer per week   • Drug use: Yes     Types: Marijuana     Comment: Edibles      Review of Systems   Constitutional:  Negative for chills and fever.   Respiratory:  Negative for cough.    Cardiovascular:  Negative for chest pain, palpitations, orthopnea, claudication and leg swelling.   Gastrointestinal:  Negative for nausea.   Musculoskeletal:  Negative for myalgias.   Neurological:  Negative for weakness.   Endo/Heme/Allergies:  Does not bruise/bleed easily.           Objective    Vitals:    09/07/23 1405   BP: 120/72   BP Location: Left arm   Patient Position: Sitting   BP Cuff Size: Adult   Pulse: (!) 56   Weight: 82.6 kg (182 lb)   Height: 1.753 m (5' 9\")     Body mass index is 26.88 kg/m².  BP Readings from Last 5 Encounters:   09/07/23 120/72   03/09/23 (!) 142/73   09/08/22 129/76   07/08/22 132/79   05/04/22 134/82      Physical Exam  Vitals reviewed.   Constitutional:       General: He is not in acute distress.     Appearance: He is well-developed. He is not diaphoretic.   HENT:      Head: Normocephalic and atraumatic.   Neck:      Thyroid: No thyromegaly.      Vascular: No JVD.   Cardiovascular:      Rate and Rhythm: Normal rate and regular rhythm.      Pulses: Normal pulses.      Heart sounds: No murmur heard.  Pulmonary:      Effort: No respiratory distress.      Breath sounds: Normal breath sounds. No wheezing or rales.   Musculoskeletal:         General: No swelling or tenderness.   Neurological:      General: No focal deficit present.      Mental Status: He is oriented to person, place, and time.      Cranial Nerves: No cranial nerve deficit.      Coordination: Coordination normal.   Psychiatric:         Mood and Affect: Mood normal.         Behavior: Behavior normal.      Accessory Clinical Findings:     Lab Results   Component Value Date    CHOLSTRLTOT 171 09/05/2023     (H) 09/05/2023    HDL 48 09/05/2023    TRIGLYCERIDE 117 09/05/2023      Lab Results "   Component Value Date/Time    LIPOPROTA 21 09/02/2022 10:19 AM      Lab Results   Component Value Date/Time    APOB 81 03/03/2023 07:17 AM                  Lab Results   Component Value Date    SODIUM 142 09/05/2023    POTASSIUM 4.9 09/05/2023    CHLORIDE 105 09/05/2023    CO2 27 09/05/2023    GLUCOSE 101 (H) 09/05/2023    BUN 18 09/05/2023    CREATININE 1.04 09/05/2023    BUNCREATRAT 13.0 11/04/2021          Lab Results   Component Value Date    URCREAT 59.48 09/02/2022    MICROALBUR <1.2 09/02/2022    MALBCRT see below 09/02/2022     Lab Results   Component Value Date/Time    MALBCRT see below 09/02/2022 10:19 AM    MICROALBUR <1.2 09/02/2022 10:19 AM      VASCULAR IMAGING:    LLE venous 11/2019   Status post ablation of the LEFT greater saphenous vein with no flow    demonstrated.   No evidence of deep venous thrombosis.     No prior duplex is available for comparison.    CAC 4/2022   Coronary calcification:  LMA - 0.0  LCX - 129.5  LAD - 164.7  RCA - 16.5  PDA - 0.0   Total Calcium Score: 310.7   Percentile: Calcium score is above the 50th percentile for the patient's age and sex.   Other findings:  Heart: Normal size.  Lungs: Clear.  Mediastinum: Normal.  Upper abdomen: Small hiatal hernia.    CT abd/pelvis 4/2022   Adrenal glands: Subtle nodularity of the adrenal glands without a discrete nodule identified.   Vasculature: Nonaneurysmal aorta. Mild scattered arterial calcifications.  1.  No acute intra-abdominal findings   2.  Diverticulosis.   3.  Atherosclerosis            MEDICAL DECISION-MAKING:  TODAY'S ASSESSMENT / STATUS / PLAN:  1. Primary hypertension  Referral to Nutrition Services    Comp Metabolic Panel    Lipid Profile    HEMOGLOBIN A1C    Referral to Vascular Medicine      2. Mixed hyperlipidemia  Referral to Nutrition Services    Lipid Profile    Referral to Vascular Medicine      3. Coronary artery calcification seen on CT scan        4. Agatston CAC score 200-399        5. Abdominal aortic  atherosclerosis (HCC)        6. Long term (current) use of antithrombotics/antiplatelets        7. Prediabetes  Referral to Nutrition Services    Comp Metabolic Panel    Lipid Profile    HEMOGLOBIN A1C         Patient Type: Primary Prevention    1. BLOOD PRESSURE CONTROL   Qualifies as Resistant HTN (RH):  No, BP at goal with <3 meds   Office BP Goal ACC/AHA (2017) goal <130/80  Home BP at goal:  yes  Office BP at goal:  yes  24h ABPM:  not ordered to date   Device candidate? Not available at this time   Contributing factors: stress   Plan:   Monitoring:   - start/continue home BP monitoring, reviewed correct technique:  Yes   - order 24h ABPM: NO  - monitor lytes/gfr routinely     2. WORK UP FOR SECONDARY CAUSES OF RH:  Obstructive Sleep Apnea: Not Yet Assessed  Renal parenchymal disease: Excluded  Renovascular HTN: Not Yet Assessed  Primary Aldosteronism: Not Yet Assessed  Thyroid Function: stable TSH on levothyroxine   Pheochromocytoma: Not Yet Assessed   Cushing's:   Not Yet Assessed   Coarctation of Aorta: excluded  Other: none identified    Recommendations At This Visit: as noted in orders         3. MEDICATION USE / ADHERENCE:   Assessment: Complete  Recommendations: Instructed to Continue Taking All Medications As Prescribed         4. HTN-MEDIATED END-ORGAN DAMAGE:  Left Ventricular Hypertrophy: Not Assessed on   one   Date: n/a   Urine Albuminuria: None on Date: 2022  Renal Function: Chronic Kidney Disease  CKD G2 at worst   Ophthalmologic: Absent per patient report and/or eye specialist     Established Cardiovascular Disease:     # CAD, evidenced by CACS = 310   - continue med mgmt, including statin and ASA     5. LIFESTYLE INTERVENTIONS:    SMOKING:   reports that he has never smoked. He has never used smokeless tobacco.   - continued complete avoidance of all tobacco products     PHYSICAL ACTIVITY: continue healthy activity to improve CV fitness.  In general, targeting >150min/week of moderate-level  "activity.   Continue excellent activity levels     WEIGHT MANAGEMENT AND NUTRITION: Mediterranean style dietary approach , Triglyceride-lowering diet with reduced fat calories, reduced simple carbohydrates, reduce/avoid alcohol and Provide specific dietary approach handouts    6. STANDARD HTN PHARMACOTHERAPY:  - continue telmisartan-amlodipine 80/10mg daily  - stopped HCTZ due to potential cause of ED - monitor response of BP and ED     7. ADDITIONAL AGENTS   -none at this time     LIPID MANAGEMENT:   Qualifies for Statin Therapy Based on 2018 ACC/AHA Guidelines: yes, Primary Prevention - 40-74yo, LDLc >70, <190 w/o DM  The 10-year ASCVD risk score (Carlton HASKINS, et al., 2019) is: 13.3%, >20% \"high risk\"    TAVARES risk with CAC score = The estimated 10-year risk of a CHD event for a person with this risk factor profile including coronary calcium is 16.1%. The estimated 10-year risk of a CHD event for a person with this risk factor profile if we did not factor in their coronary calcium score would be 11.3%.  - TAVARES - 70% for age/gender   Major ASCVD events: CAD and/or CAC >300    High-risk conditions: N/A, >64yr old  and Hypertension   Risk-enhancers: Persistently elevated trigs >174   Reviewed risks/benefits/alternatives for lipid-lowering therapies, and through shared decision-making we have opted to initiate statin therapy to lower ASCVD risk.    Currently on Statin: intolerant to daily rosuvastatin  Tx goals: LDL-C <70 (if HTG, consider apoB<80, non-HDL-C <100) due CACS >100  At goal? No, 2/2023  Plan:   - reinforced ongoing TLC measures as noted   - monitor labs   Meds:   - continue rosuva 20mg QOD - monitor closely, consider alternative statin if sx   - continue  zetia 10mg     GLYCEMIA MANAGEMENT:  Prediabetic, mild IFG       Plan:  - continue healthy diet, weight reduction, daily physical activity   - consider metformin up to 850mg BID to slow or offset progression to T2D as per DPP trial   - monitor labs " Q6-12mo    ANTIPLATELET/ANTICOAGULANT THERAPY:    - has stopped ASA for now per his request - worried about GI effects   CACS >100 favors aspirin therapy as per evidence from [employer] (2020) indicating net benefit in primary prevention patients who are at low risk for bleeding.      OTHER ISSUES none     Studies Ordered At This Visit: CACS 2025   Blood Work to Be Obtained Prior to Next Visit: as noted below   Disposition: 6mo       Oh Muir M.D.  [facility]   [facility]   [phone]

## 2023-12-22 DIAGNOSIS — I10 PRIMARY HYPERTENSION: ICD-10-CM

## 2023-12-22 DIAGNOSIS — E78.2 MIXED HYPERLIPIDEMIA: ICD-10-CM

## 2023-12-22 RX ORDER — ROSUVASTATIN CALCIUM 20 MG/1
20 TABLET, COATED ORAL
Qty: 45 TABLET | Refills: 3 | Status: SHIPPED | OUTPATIENT
Start: 2023-12-22

## 2024-03-05 ENCOUNTER — TELEPHONE (OUTPATIENT)
Dept: VASCULAR LAB | Facility: MEDICAL CENTER | Age: 69
End: 2024-03-05
Payer: MEDICARE

## 2024-03-05 NOTE — TELEPHONE ENCOUNTER
Established patient  Chart prep for upcoming appointment with Dr. Muir    Any pending/incomplete orders from last visit? Yes Labs, patient reminded to try to complete prior to appointment  Were any records requested?  No  Correct appointment type (New/Established/virtual)? Yes  Referral up to date? Yes  Referral attached to appointment (renewals and New patients only)? N/A (established with up-to-date referral)

## 2024-03-07 LAB
ALBUMIN SERPL-MCNC: 4.5 G/DL (ref 3.9–4.9)
ALBUMIN/GLOB SERPL: 1.9 {RATIO} (ref 1.2–2.2)
ALP SERPL-CCNC: 85 IU/L (ref 44–121)
ALT SERPL-CCNC: 23 IU/L (ref 0–44)
AST SERPL-CCNC: 20 IU/L (ref 0–40)
BILIRUB SERPL-MCNC: 0.6 MG/DL (ref 0–1.2)
BUN SERPL-MCNC: 12 MG/DL (ref 8–27)
BUN/CREAT SERPL: 13 (ref 10–24)
CALCIUM SERPL-MCNC: 9.7 MG/DL (ref 8.6–10.2)
CHLORIDE SERPL-SCNC: 103 MMOL/L (ref 96–106)
CHOLEST SERPL-MCNC: 201 MG/DL (ref 100–199)
CO2 SERPL-SCNC: 23 MMOL/L (ref 20–29)
CREAT SERPL-MCNC: 0.96 MG/DL (ref 0.76–1.27)
EGFRCR SERPLBLD CKD-EPI 2021: 86 ML/MIN/1.73
GLOBULIN SER CALC-MCNC: 2.4 G/DL (ref 1.5–4.5)
GLUCOSE SERPL-MCNC: 87 MG/DL (ref 70–99)
HBA1C MFR BLD: 5.8 % (ref 4.8–5.6)
HDLC SERPL-MCNC: 54 MG/DL
LABORATORY COMMENT REPORT: ABNORMAL
LDLC SERPL CALC-MCNC: 117 MG/DL (ref 0–99)
POTASSIUM SERPL-SCNC: 4.4 MMOL/L (ref 3.5–5.2)
PROT SERPL-MCNC: 6.9 G/DL (ref 6–8.5)
SODIUM SERPL-SCNC: 143 MMOL/L (ref 134–144)
TRIGL SERPL-MCNC: 173 MG/DL (ref 0–149)
VLDLC SERPL CALC-MCNC: 30 MG/DL (ref 5–40)

## 2024-03-11 ENCOUNTER — OFFICE VISIT (OUTPATIENT)
Dept: VASCULAR LAB | Facility: MEDICAL CENTER | Age: 69
End: 2024-03-11
Attending: FAMILY MEDICINE
Payer: MEDICARE

## 2024-03-11 VITALS
DIASTOLIC BLOOD PRESSURE: 75 MMHG | BODY MASS INDEX: 27.25 KG/M2 | HEIGHT: 69 IN | WEIGHT: 184 LBS | SYSTOLIC BLOOD PRESSURE: 129 MMHG | HEART RATE: 55 BPM

## 2024-03-11 DIAGNOSIS — I10 PRIMARY HYPERTENSION: ICD-10-CM

## 2024-03-11 DIAGNOSIS — Z79.02 LONG TERM (CURRENT) USE OF ANTITHROMBOTICS/ANTIPLATELETS: ICD-10-CM

## 2024-03-11 DIAGNOSIS — E78.00 PRIMARY HYPERCHOLESTEROLEMIA: ICD-10-CM

## 2024-03-11 DIAGNOSIS — I70.0 ABDOMINAL AORTIC ATHEROSCLEROSIS (HCC): ICD-10-CM

## 2024-03-11 DIAGNOSIS — I25.10 CORONARY ARTERY CALCIFICATION SEEN ON CT SCAN: ICD-10-CM

## 2024-03-11 DIAGNOSIS — R93.1 AGATSTON CAC SCORE 200-399: ICD-10-CM

## 2024-03-11 DIAGNOSIS — R73.03 PREDIABETES: ICD-10-CM

## 2024-03-11 PROCEDURE — 3078F DIAST BP <80 MM HG: CPT | Performed by: FAMILY MEDICINE

## 2024-03-11 PROCEDURE — 99212 OFFICE O/P EST SF 10 MIN: CPT

## 2024-03-11 PROCEDURE — 3074F SYST BP LT 130 MM HG: CPT | Performed by: FAMILY MEDICINE

## 2024-03-11 PROCEDURE — 99214 OFFICE O/P EST MOD 30 MIN: CPT | Performed by: FAMILY MEDICINE

## 2024-03-11 RX ORDER — MIRTAZAPINE 15 MG/1
15 TABLET, FILM COATED ORAL
COMMUNITY
Start: 2024-01-26

## 2024-03-11 RX ORDER — TELMISARTAN AND AMLODIPINE 10; 80 MG/1; MG/1
TABLET ORAL
COMMUNITY
Start: 2024-02-14

## 2024-03-11 ASSESSMENT — ENCOUNTER SYMPTOMS
WEAKNESS: 0
CHILLS: 0
NAUSEA: 0
ORTHOPNEA: 0
CLAUDICATION: 0
COUGH: 0
FEVER: 0
BRUISES/BLEEDS EASILY: 0
MYALGIAS: 0
PALPITATIONS: 0

## 2024-03-11 ASSESSMENT — FIBROSIS 4 INDEX: FIB4 SCORE: 1.07

## 2024-03-11 NOTE — PROGRESS NOTES
RESISTANT HTN CLINIC - FOLLOW-UP  03/11/24    Mahad Vyas is a male seen for evaluation of resistant HTN and management.   Mahad Vyas is initially referred by Severino Beaulieu M.D, est 7/2022     Subjective      Current/interval HTN concerns:  last seen 9/2023, feeling well.  GI issues since trip to [place].      HTN:    Home BP log: occ checking 120s/70s mostly,  occ 130s  Adherence to current HTN meds: compliant all of the time   Lifestyle factors:  Weight Change: stable   Wt Readings from Last 3 Encounters:   03/11/24 83.5 kg (184 lb)   09/07/23 82.6 kg (182 lb)   03/09/23 85.7 kg (189 lb)      Diet pattern: common adult, increase CHO intake   Daily salt intake estimate:  Low     EtOH: Yes, Details: social, usu <2/day   Exercise habits:  10k steps/day most days,  gym 4x/week  - less active   Perceived barriers to care: none     Hyperlipidemia  stable, Current treatment: lifestyle changes  and Moderate intensity   BASELINE LIPID:   Latest Reference Range & Units 01/26/22 08:45   Cholesterol,Tot 100 - 199 mg/dL 238 (H)   Triglycerides 0 - 149 mg/dL 199 (H)   HDL >=40 mg/dL 52   LDL <100 mg/dL 146 (H)     Antiplatelet/anticoagulation: No       Current Outpatient Medications:     Telmisartan-amLODIPine,     mirtazapine, 15 mg, Oral, QHS    rosuvastatin, 20 mg, Oral, Q48HRS, Taking    Fexofenadine-Pseudoephedrine (ALLEGRA-D 24 HOUR PO), 1 Tablet, Oral, QDAY PRN, Taking    ezetimibe, 10 mg, Oral, DAILY, Taking    omeprazole, , Taking    traZODone, 50 mg, Oral, HS PRN, Taking    vitamin D, 1,000 Units, Oral, DAILY, Taking    azelastine, 1 Spray, Nasal, BID, Taking    levothyroxine, 50 mcg, Oral, AM ES, Taking    fluticasone, 1 Spray, Nasal, DAILY, Taking    diphenoxylate-atropine, 1 Tablet, Oral, 4X/DAY PRN, Taking    sildenafil citrate, 100 mg, Oral, PRN, Taking    aspirin, 81 mg, Oral, DAILY     Social History     Tobacco Use    Smoking status: Never    Smokeless tobacco: Never   Vaping Use  "   Vaping Use: Never used   Substance Use Topics    Alcohol use: Yes     Alcohol/week: 4.2 - 6.0 oz     Types: 7 - 10 Cans of beer per week    Drug use: Yes     Types: Marijuana     Comment: Edibles      Review of Systems   Constitutional:  Negative for chills and fever.   Respiratory:  Negative for cough.    Cardiovascular:  Negative for chest pain, palpitations, orthopnea, claudication and leg swelling.   Gastrointestinal:  Negative for nausea.   Musculoskeletal:  Negative for myalgias.   Neurological:  Negative for weakness.   Endo/Heme/Allergies:  Does not bruise/bleed easily.       Objective    Vitals:    03/11/24 1347   BP: 129/75   BP Location: Left arm   Patient Position: Sitting   BP Cuff Size: Adult   Pulse: (!) 55   Weight: 83.5 kg (184 lb)   Height: 1.753 m (5' 9\")     Body mass index is 27.17 kg/m².  BP Readings from Last 5 Encounters:   03/11/24 129/75   09/07/23 120/72   03/09/23 (!) 142/73   09/08/22 129/76   07/08/22 132/79      Physical Exam  Vitals reviewed.   Constitutional:       General: He is not in acute distress.     Appearance: He is well-developed. He is not diaphoretic.   HENT:      Head: Normocephalic and atraumatic.   Neck:      Thyroid: No thyromegaly.      Vascular: No JVD.   Cardiovascular:      Rate and Rhythm: Normal rate and regular rhythm.      Pulses: Normal pulses.      Heart sounds: No murmur heard.  Pulmonary:      Effort: No respiratory distress.      Breath sounds: Normal breath sounds. No wheezing or rales.   Musculoskeletal:         General: No swelling or tenderness.   Neurological:      General: No focal deficit present.      Mental Status: He is oriented to person, place, and time.      Cranial Nerves: No cranial nerve deficit.      Coordination: Coordination normal.   Psychiatric:         Mood and Affect: Mood normal.         Behavior: Behavior normal.        Accessory Clinical Findings:     Lab Results   Component Value Date    CHOLSTRLTOT 201 (H) 03/06/2024    " CHOLSTRLTOT 171 09/05/2023     (H) 09/05/2023    HDL 54 03/06/2024    HDL 48 09/05/2023    TRIGLYCERIDE 173 (H) 03/06/2024    TRIGLYCERIDE 117 09/05/2023      Lab Results   Component Value Date/Time     (H) 09/05/2023 08:56 AM     (H) 03/03/2023 07:17 AM    LDL 73 09/02/2022 10:19 AM     (H) 01/26/2022 08:45 AM      Lab Results   Component Value Date/Time    LIPOPROTA 21 09/02/2022 10:19 AM      Lab Results   Component Value Date/Time    APOB 81 03/03/2023 07:17 AM            Lab Results   Component Value Date    HBA1C 5.8 (H) 03/06/2024      Lab Results   Component Value Date    SODIUM 143 03/06/2024    SODIUM 142 09/05/2023    POTASSIUM 4.4 03/06/2024    POTASSIUM 4.9 09/05/2023    CHLORIDE 103 03/06/2024    CHLORIDE 105 09/05/2023    CO2 23 03/06/2024    CO2 27 09/05/2023    GLUCOSE 87 03/06/2024    GLUCOSE 101 (H) 09/05/2023    BUN 12 03/06/2024    BUN 18 09/05/2023    CREATININE 0.96 03/06/2024    CREATININE 1.04 09/05/2023    BUNCREATRAT 13 03/06/2024          Lab Results   Component Value Date    URCREAT 59.48 09/02/2022    MICROALBUR <1.2 09/02/2022    MALBCRT see below 09/02/2022     Lab Results   Component Value Date/Time    MALBCRT see below 09/02/2022 10:19 AM    MICROALBUR <1.2 09/02/2022 10:19 AM      VASCULAR IMAGING:    LLE venous 11/2019   Status post ablation of the LEFT greater saphenous vein with no flow    demonstrated.   No evidence of deep venous thrombosis.     No prior duplex is available for comparison.    CAC 4/2022   Coronary calcification:  LMA - 0.0  LCX - 129.5  LAD - 164.7  RCA - 16.5  PDA - 0.0   Total Calcium Score: 310.7   Percentile: Calcium score is above the 50th percentile for the patient's age and sex.   Other findings:  Heart: Normal size.  Lungs: Clear.  Mediastinum: Normal.  Upper abdomen: Small hiatal hernia.    CT abd/pelvis 4/2022   Adrenal glands: Subtle nodularity of the adrenal glands without a discrete nodule identified.   Vasculature:  Nonaneurysmal aorta. Mild scattered arterial calcifications.  1.  No acute intra-abdominal findings   2.  Diverticulosis.   3.  Atherosclerosis          MEDICAL DECISION-MAKING:  TODAY'S ASSESSMENT / STATUS / PLAN:  1. Primary hypercholesterolemia  APOLIPOPROTEIN B    Comp Metabolic Panel    Lipid Profile    CBC WITHOUT DIFFERENTIAL    MICROALBUMIN CREAT RATIO URINE      2. Abdominal aortic atherosclerosis (HCC)        3. Primary hypertension  APOLIPOPROTEIN B    Comp Metabolic Panel    Lipid Profile    CBC WITHOUT DIFFERENTIAL    MICROALBUMIN CREAT RATIO URINE      4. Coronary artery calcification seen on CT scan  CRP HIGH SENSITIVE (CARDIAC)      5. Agatston CAC score 200-399  CRP HIGH SENSITIVE (CARDIAC)      6. Prediabetes        7. Long term (current) use of antithrombotics/antiplatelets           Patient Type: Secondary Prevention due to CACS >300    1. BLOOD PRESSURE CONTROL   Qualifies as Resistant HTN (RH):  No, BP at goal with <3 meds   Office BP Goal ACC/AHA (2017) goal <130/80  Home BP at goal:  yes  Office BP at goal:  yes  24h ABPM:  not ordered to date   Device candidate? Not available at this time   Contributing factors: stress   Plan:   Monitoring:   - start/continue home BP monitoring, reviewed correct technique:  Yes   - order 24h ABPM: NO  - monitor lytes/gfr routinely     2. WORK UP FOR SECONDARY CAUSES OF RH:  Obstructive Sleep Apnea: Not Yet Assessed  Renal parenchymal disease: Excluded  Renovascular HTN: Not Yet Assessed  Primary Aldosteronism: Not Yet Assessed  Thyroid Function: stable TSH on levothyroxine   Pheochromocytoma: Not Yet Assessed   Cushing's:   Not Yet Assessed   Coarctation of Aorta: excluded  Other: none identified    Recommendations At This Visit: as noted in orders         3. MEDICATION USE / ADHERENCE:   Assessment: Complete  Recommendations: Instructed to Continue Taking All Medications As Prescribed         4. HTN-MEDIATED END-ORGAN DAMAGE:  Left Ventricular Hypertrophy:  Not Assessed on   one   Date: n/a   Urine Albuminuria: None on Date: 2022  Renal Function: Chronic Kidney Disease  CKD G2 at worst   Ophthalmologic: Absent per patient report and/or eye specialist     Established Cardiovascular Disease:     # Asymptomatic, subclinical CAD (evidenced by CACS = 310 (70%ile for age/gender) in 2022)  - no prior dx ASCVD events  - no LMA plaque noted   - no indications for functional testing w/o symptoms   - as reviewed with pt, ACC/AHA guidelines for chronic CAD mgmt (2023) support GDMT alone as initial mgmt citing multiple RCTs (ISCHEMIA, COURAGE, PRESTON 2D) demonstrating early revasc strategy plus GDMT did not improve MACE outcomes compared to GDMT alone  Plan:  - continue TLC and med mgmt as noted below   - consider functional testing if new symptoms over time      # Non-aneurysmal aortic atherosclerosis (AS) - CT, no symptoms or prior known associated clinical manifestations  - general indicator of systemic AS with higher potential AS in other vascular beds  - Higher overall ASCVD risk  Plan:  - no further imaging surveillance, continue med mgmt      5. LIFESTYLE INTERVENTIONS:    SMOKING:   reports that he has never smoked. He has never used smokeless tobacco.   - continued complete avoidance of all tobacco products     PHYSICAL ACTIVITY: continue healthy activity to improve CV fitness.  In general, targeting >150min/week of moderate-level activity.   Continue excellent activity levels     WEIGHT MANAGEMENT AND NUTRITION: Mediterranean style dietary approach , Triglyceride-lowering diet with reduced fat calories, reduced simple carbohydrates, reduce/avoid alcohol and Provide specific dietary approach handouts  - CURRENT PRIMARY FOCUS     6. STANDARD HTN PHARMACOTHERAPY:  - continue telmisartan-amlodipine 80/10mg daily  - stopped HCTZ due to potential cause of ED - monitor response of BP and ED     7. ADDITIONAL AGENTS   -none at this time     LIPID MANAGEMENT:   Qualifies for Statin  "Therapy Based on 2018 ACC/AHA Guidelines: yes, Primary Prevention - 40-76yo, LDLc >70, <190 w/o DM  The 10-year ASCVD risk score (Carlton DK, et al., 2019) is: 19.2%, >20% \"high risk\"    TAVARES risk with CAC score = The estimated 10-year risk of a CHD event for a person with this risk factor profile including coronary calcium is 16.1%. The estimated 10-year risk of a CHD event for a person with this risk factor profile if we did not factor in their coronary calcium score would be 11.3%.  - TAVARES - 70% for age/gender   Major ASCVD events: CAD and/or CAC >300    High-risk conditions: N/A, >64yr old  and Hypertension   Risk-enhancers: Persistently elevated trigs >174   Currently on Statin: intolerant to daily rosuvastatin  Tx goals: LDL-C <70 (if HTG, consider apoB<80, non-HDL-C <100) due CACS >100  At goal? No, 3/2024   Plan:   - reinforced ongoing TLC measures as noted   - monitor labs   Meds:   - continue rosuva 20mg 3d/week - MAX TOLERATED   - continue zetia 10mg   - consider nexletol if <20% to LDL-C goal   - consider PCSK9i strategy if >20% to LDL-C goal     GLYCEMIA MANAGEMENT:  Prediabetic  Lab Results   Component Value Date    HBA1C 5.8 (H) 03/06/2024    Plan:  - continue healthy diet, weight reduction, daily physical activity   - consider metformin up to 850mg BID to slow or offset progression to T2D as per DPP trial   - monitor labs Q6-12mo    ANTIPLATELET/ANTICOAGULANT THERAPY:    - has stopped ASA for now per his request - worried about GI effects   CACS >100 favors aspirin therapy as per evidence from [facility] (2020) indicating net benefit in primary prevention patients who are at low risk for bleeding.      OTHER ISSUES none     Studies Ordered At This Visit: CACS 2025   Blood Work to Be Obtained Prior to Next Visit: as noted in assessment   Disposition: 6mo     Oh Muir M.D.  [facility]   [facility]   [phone]   "

## 2024-05-09 ENCOUNTER — APPOINTMENT (RX ONLY)
Dept: URBAN - METROPOLITAN AREA CLINIC 4 | Facility: CLINIC | Age: 69
Setting detail: DERMATOLOGY
End: 2024-05-09

## 2024-05-09 DIAGNOSIS — L81.4 OTHER MELANIN HYPERPIGMENTATION: ICD-10-CM

## 2024-05-09 DIAGNOSIS — D22 MELANOCYTIC NEVI: ICD-10-CM

## 2024-05-09 DIAGNOSIS — D17 BENIGN LIPOMATOUS NEOPLASM: ICD-10-CM

## 2024-05-09 DIAGNOSIS — L82.1 OTHER SEBORRHEIC KERATOSIS: ICD-10-CM

## 2024-05-09 DIAGNOSIS — D18.0 HEMANGIOMA: ICD-10-CM

## 2024-05-09 PROBLEM — D17.0 BENIGN LIPOMATOUS NEOPLASM OF SKIN AND SUBCUTANEOUS TISSUE OF HEAD, FACE AND NECK: Status: ACTIVE | Noted: 2024-05-09

## 2024-05-09 PROBLEM — D22.5 MELANOCYTIC NEVI OF TRUNK: Status: ACTIVE | Noted: 2024-05-09

## 2024-05-09 PROBLEM — D18.01 HEMANGIOMA OF SKIN AND SUBCUTANEOUS TISSUE: Status: ACTIVE | Noted: 2024-05-09

## 2024-05-09 PROCEDURE — ? COUNSELING

## 2024-05-09 PROCEDURE — 99213 OFFICE O/P EST LOW 20 MIN: CPT

## 2024-05-09 PROCEDURE — ? ADDITIONAL NOTES

## 2024-05-09 ASSESSMENT — LOCATION SIMPLE DESCRIPTION DERM
LOCATION SIMPLE: SCALP
LOCATION SIMPLE: LEFT UPPER BACK
LOCATION SIMPLE: RIGHT UPPER BACK
LOCATION SIMPLE: LEFT CHEEK

## 2024-05-09 ASSESSMENT — LOCATION DETAILED DESCRIPTION DERM
LOCATION DETAILED: LEFT SUPERIOR MEDIAL UPPER BACK
LOCATION DETAILED: RIGHT MEDIAL UPPER BACK
LOCATION DETAILED: LEFT MEDIAL UPPER BACK
LOCATION DETAILED: LEFT SUPERIOR PARIETAL SCALP
LOCATION DETAILED: LEFT CENTRAL MALAR CHEEK

## 2024-05-09 ASSESSMENT — LOCATION ZONE DERM
LOCATION ZONE: TRUNK
LOCATION ZONE: FACE
LOCATION ZONE: SCALP

## 2024-05-09 NOTE — PROCEDURE: ADDITIONAL NOTES
Render Risk Assessment In Note?: no
Additional Notes: Patient declines removal.
Detail Level: Generalized

## 2024-09-09 DIAGNOSIS — I10 PRIMARY HYPERTENSION: ICD-10-CM

## 2024-09-09 DIAGNOSIS — E78.00 PRIMARY HYPERCHOLESTEROLEMIA: ICD-10-CM

## 2024-09-11 LAB
ALBUMIN SERPL-MCNC: 4.5 G/DL (ref 3.9–4.9)
ALBUMIN/CREAT UR: <4 MG/G CREAT (ref 0–29)
ALP SERPL-CCNC: 80 IU/L (ref 44–121)
ALT SERPL-CCNC: 21 IU/L (ref 0–44)
APO B SERPL-MCNC: 91 MG/DL
AST SERPL-CCNC: 19 IU/L (ref 0–40)
BILIRUB SERPL-MCNC: 1 MG/DL (ref 0–1.2)
BUN SERPL-MCNC: 14 MG/DL (ref 8–27)
BUN/CREAT SERPL: 16 (ref 10–24)
CALCIUM SERPL-MCNC: 9.7 MG/DL (ref 8.6–10.2)
CHLORIDE SERPL-SCNC: 103 MMOL/L (ref 96–106)
CHOLEST SERPL-MCNC: 177 MG/DL (ref 100–199)
CO2 SERPL-SCNC: 25 MMOL/L (ref 20–29)
CREAT SERPL-MCNC: 0.87 MG/DL (ref 0.76–1.27)
CREAT UR-MCNC: 72.8 MG/DL
EGFRCR SERPLBLD CKD-EPI 2021: 93 ML/MIN/1.73
ERYTHROCYTE [DISTWIDTH] IN BLOOD BY AUTOMATED COUNT: 12.9 % (ref 11.6–15.4)
GLOBULIN SER CALC-MCNC: 2.4 G/DL (ref 1.5–4.5)
GLUCOSE SERPL-MCNC: 83 MG/DL (ref 70–99)
HCT VFR BLD AUTO: 52.2 % (ref 37.5–51)
HDLC SERPL-MCNC: 48 MG/DL
HGB BLD-MCNC: 17.1 G/DL (ref 13–17.7)
LDL CALC COMMENT:: ABNORMAL
LDLC SERPL CALC-MCNC: 102 MG/DL (ref 0–99)
MCH RBC QN AUTO: 31.6 PG (ref 26.6–33)
MCHC RBC AUTO-ENTMCNC: 32.8 G/DL (ref 31.5–35.7)
MCV RBC AUTO: 97 FL (ref 79–97)
MICROALBUMIN UR-MCNC: <3 UG/ML
NRBC BLD AUTO-RTO: ABNORMAL %
PLATELET # BLD AUTO: 267 X10E3/UL (ref 150–450)
POTASSIUM SERPL-SCNC: 4.3 MMOL/L (ref 3.5–5.2)
PROT SERPL-MCNC: 6.9 G/DL (ref 6–8.5)
RBC # BLD AUTO: 5.41 X10E6/UL (ref 4.14–5.8)
SODIUM SERPL-SCNC: 141 MMOL/L (ref 134–144)
TRIGL SERPL-MCNC: 157 MG/DL (ref 0–149)
VLDLC SERPL CALC-MCNC: 27 MG/DL (ref 5–40)
WBC # BLD AUTO: 7.5 X10E3/UL (ref 3.4–10.8)

## 2024-09-26 ENCOUNTER — APPOINTMENT (OUTPATIENT)
Dept: VASCULAR LAB | Facility: MEDICAL CENTER | Age: 69
End: 2024-09-26
Payer: MEDICARE

## 2024-11-25 ENCOUNTER — OFFICE VISIT (OUTPATIENT)
Dept: VASCULAR LAB | Facility: MEDICAL CENTER | Age: 69
End: 2024-11-25
Attending: FAMILY MEDICINE
Payer: MEDICARE

## 2024-11-25 VITALS
SYSTOLIC BLOOD PRESSURE: 141 MMHG | DIASTOLIC BLOOD PRESSURE: 90 MMHG | BODY MASS INDEX: 27.25 KG/M2 | HEIGHT: 69 IN | WEIGHT: 184 LBS | HEART RATE: 76 BPM

## 2024-11-25 DIAGNOSIS — I25.10 CORONARY ARTERY CALCIFICATION SEEN ON CT SCAN: ICD-10-CM

## 2024-11-25 DIAGNOSIS — I70.0 ABDOMINAL AORTIC ATHEROSCLEROSIS (HCC): ICD-10-CM

## 2024-11-25 DIAGNOSIS — I10 PRIMARY HYPERTENSION: ICD-10-CM

## 2024-11-25 DIAGNOSIS — R73.03 PREDIABETES: ICD-10-CM

## 2024-11-25 DIAGNOSIS — Z91.89 OTHER SPECIFIED PERSONAL RISK FACTORS, NOT ELSEWHERE CLASSIFIED: ICD-10-CM

## 2024-11-25 DIAGNOSIS — E78.2 MIXED HYPERLIPIDEMIA: ICD-10-CM

## 2024-11-25 DIAGNOSIS — Z79.02 LONG TERM (CURRENT) USE OF ANTITHROMBOTICS/ANTIPLATELETS: ICD-10-CM

## 2024-11-25 DIAGNOSIS — R93.1 AGATSTON CAC SCORE 200-399: ICD-10-CM

## 2024-11-25 PROCEDURE — 99212 OFFICE O/P EST SF 10 MIN: CPT

## 2024-11-25 PROCEDURE — 99214 OFFICE O/P EST MOD 30 MIN: CPT | Performed by: FAMILY MEDICINE

## 2024-11-25 PROCEDURE — 3079F DIAST BP 80-89 MM HG: CPT | Performed by: FAMILY MEDICINE

## 2024-11-25 PROCEDURE — 3077F SYST BP >= 140 MM HG: CPT | Performed by: FAMILY MEDICINE

## 2024-11-25 RX ORDER — ROSUVASTATIN CALCIUM 20 MG/1
20 TABLET, COATED ORAL
Qty: 45 TABLET | Refills: 3 | Status: SHIPPED | OUTPATIENT
Start: 2024-11-25

## 2024-11-25 RX ORDER — EZETIMIBE 10 MG/1
10 TABLET ORAL DAILY
Qty: 100 TABLET | Refills: 3 | Status: SHIPPED | OUTPATIENT
Start: 2024-11-25

## 2024-11-25 ASSESSMENT — ENCOUNTER SYMPTOMS
ORTHOPNEA: 0
CLAUDICATION: 0
PALPITATIONS: 0
CHILLS: 0
MYALGIAS: 0
COUGH: 0
BRUISES/BLEEDS EASILY: 0
FEVER: 0
WEAKNESS: 0
NAUSEA: 0

## 2024-11-25 ASSESSMENT — FIBROSIS 4 INDEX: FIB4 SCORE: 1.09

## 2024-11-25 NOTE — PROGRESS NOTES
RESISTANT HTN CLINIC - FOLLOW-UP  11/25/24    Mahad Vyas is a male seen for evaluation of resistant HTN and management.   Mahda Vyas is initially referred by Severino Beaulieu M.D, est 7/2022     Subjective      Interval hx/concerns: last seen 3/2024, had interval labs. Interested in repeat CACS.     Just returned from 4mo [place] vac[place]ation with signficant daily walking w/o symptoms.    Feels today's BP higher than normal     HTN:  Home BP log: occ checking 120s/70s.  Tolerating meds with good adherence.    Lifestyle factors:  Weight Change: stable   Diet pattern: oatmeal, eggs 6/wk, berries.  High pasta and soup intake. Limited red meat   Daily salt intake estimate:  Low     EtOH: Yes, Details: social, usu <2/day   Exercise habits:  10k steps/day most days,  gym 4x/week  - less active   Perceived barriers to care: none     HLD:  stable, Current treatment: lifestyle changes  and Moderate intensity - good tolerance   Reports not taking zetia as prior rx'd.    BASELINE LIPID:   Latest Reference Range & Units 01/26/22 08:45   Cholesterol,Tot 100 - 199 mg/dL 238 (H)   Triglycerides 0 - 149 mg/dL 199 (H)   HDL >=40 mg/dL 52   LDL <100 mg/dL 146 (H)     Antiplatelet/anticoagulation: No     Current Outpatient Medications on File Prior to Visit   Medication Sig Dispense Refill    Telmisartan-amLODIPine 80-10 MG Tab       Fexofenadine-Pseudoephedrine (ALLEGRA-D 24 HOUR PO) Take 1 Tablet by mouth 1 time a day as needed.      omeprazole (PRILOSEC) 20 MG delayed-release capsule       vitamin D (VITAMIND D3) 1000 UNIT Tab Take 1,000 Units by mouth every day.      azelastine (ASTELIN) 137 MCG/SPRAY nasal spray Administer 1 Spray into affected nostril(S) 2 times a day. 30 mL 0    levothyroxine (SYNTHROID) 50 MCG Tab Take 1 Tab by mouth Every morning on an empty stomach. 90 Tab 3    fluticasone (FLONASE) 50 MCG/ACT nasal spray Spray 1 Spray in nose every day.      diphenoxylate-atropine (LOMOTIL)  "2.5-0.025 MG Tab Take 1 Tab by mouth 4 times a day as needed for Diarrhea.      sildenafil citrate (VIAGRA) 100 MG tablet Take 100 mg by mouth as needed for Erectile Dysfunction.      mirtazapine (REMERON) 15 MG Tab Take 15 mg by mouth at bedtime.      aspirin (ASA) 81 MG Chew Tab chewable tablet Chew 1 Tablet every day. 100 Tablet     traZODone (DESYREL) 50 MG Tab TAKE 1 TABLET BY MOUTH AT BEDTIME AS NEEDED FOR SLEEP 45 Tablet 0     No current facility-administered medications on file prior to visit.      Allergies   Allergen Reactions    Aspirin      GI specific         Social History     Tobacco Use    Smoking status: Never    Smokeless tobacco: Never   Vaping Use    Vaping status: Never Used   Substance Use Topics    Alcohol use: Yes     Alcohol/week: 4.2 - 6.0 oz     Types: 7 - 10 Cans of beer per week    Drug use: Yes     Types: Marijuana     Comment: Edibles      Review of Systems   Constitutional:  Negative for chills and fever.   Respiratory:  Negative for cough.    Cardiovascular:  Negative for chest pain, palpitations, orthopnea, claudication and leg swelling.   Gastrointestinal:  Negative for nausea.   Musculoskeletal:  Negative for myalgias.   Neurological:  Negative for weakness.   Endo/Heme/Allergies:  Does not bruise/bleed easily.       Objective    Vitals:    11/25/24 1346 11/25/24 1350   BP: (!) 151/81 (!) 141/90   BP Location: Left arm Left arm   Patient Position: Sitting Sitting   BP Cuff Size: Large adult Large adult   Pulse: 72 76   Weight: 83.5 kg (184 lb)    Height: 1.753 m (5' 9\")      Body mass index is 27.17 kg/m².  BP Readings from Last 5 Encounters:   11/25/24 (!) 141/90   03/11/24 129/75   09/07/23 120/72   03/09/23 (!) 142/73   09/08/22 129/76      Physical Exam  Vitals reviewed.   Constitutional:       General: He is not in acute distress.     Appearance: He is well-developed. He is not diaphoretic.   HENT:      Head: Normocephalic and atraumatic.   Neck:      Thyroid: No thyromegaly. " "     Vascular: No JVD.   Cardiovascular:      Rate and Rhythm: Normal rate and regular rhythm.      Pulses: Normal pulses.      Heart sounds: No murmur heard.  Pulmonary:      Effort: No respiratory distress.      Breath sounds: Normal breath sounds. No wheezing or rales.   Musculoskeletal:         General: No swelling or tenderness.   Neurological:      General: No focal deficit present.      Mental Status: He is oriented to person, place, and time.      Cranial Nerves: No cranial nerve deficit.      Coordination: Coordination normal.   Psychiatric:         Mood and Affect: Mood normal.         Behavior: Behavior normal.        Accessory Clinical Findings:     Lab Results   Component Value Date    CHOLSTRLTOT 177 09/05/2024    CHOLSTRLTOT 171 09/05/2023     (H) 09/05/2023    HDL 48 09/05/2024    HDL 48 09/05/2023    TRIGLYCERIDE 157 (H) 09/05/2024    TRIGLYCERIDE 117 09/05/2023      Lab Results   Component Value Date/Time     (H) 09/05/2023 08:56 AM     (H) 03/03/2023 07:17 AM    LDL 73 09/02/2022 10:19 AM     (H) 01/26/2022 08:45 AM     Lab Results   Component Value Date/Time    LIPOPROTA 21 09/02/2022 10:19 AM      Lab Results   Component Value Date/Time    APOB 81 03/03/2023 07:17 AM    APOB 80 09/02/2022 10:19 AM      No results found for: \"CRPHIGHSEN\"    Lab Results   Component Value Date    HBA1C 5.8 (H) 03/06/2024      Lab Results   Component Value Date    SODIUM 141 09/05/2024    SODIUM 142 09/05/2023    POTASSIUM 4.3 09/05/2024    POTASSIUM 4.9 09/05/2023    CHLORIDE 103 09/05/2024    CHLORIDE 105 09/05/2023    CO2 25 09/05/2024    CO2 27 09/05/2023    GLUCOSE 83 09/05/2024    GLUCOSE 101 (H) 09/05/2023    BUN 14 09/05/2024    BUN 18 09/05/2023    CREATININE 0.87 09/05/2024    CREATININE 1.04 09/05/2023    BUNCREATRAT 16 09/05/2024               Lab Results   Component Value Date/Time    MALBCRT see below 09/02/2022 10:19 AM    MICROALBUR <1.2 09/02/2022 10:19 AM    MICRALB <3.0 " 09/05/2024 04:40 AM        IMAGING:    LLE venous 11/2019   Status post ablation of the LEFT greater saphenous vein with no flow    demonstrated.   No evidence of deep venous thrombosis.     No prior duplex is available for comparison.    CAC 4/2022   Coronary calcification:  LMA - 0.0  LCX - 129.5  LAD - 164.7  RCA - 16.5  PDA - 0.0   Total Calcium Score: 310.7   Percentile: Calcium score is above the 50th percentile for the patient's age and sex.   Other findings:  Heart: Normal size.  Lungs: Clear.  Mediastinum: Normal.  Upper abdomen: Small hiatal hernia.    CT abd/pelvis 4/2022   Adrenal glands: Subtle nodularity of the adrenal glands without a discrete nodule identified.   Vasculature: Nonaneurysmal aorta. Mild scattered arterial calcifications.  1.  No acute intra-abdominal findings   2.  Diverticulosis.   3.  Atherosclerosis          MEDICAL DECISION-MAKING:  TODAY'S ASSESSMENT / STATUS / PLAN:  1. Mixed hyperlipidemia  CT-CARDIAC SCORING    ezetimibe (ZETIA) 10 MG Tab    APOLIPOPROTEIN B    Comp Metabolic Panel    Lipid Profile      2. Primary hypertension  rosuvastatin (CRESTOR) 20 MG Tab    MICROALBUMIN CREAT RATIO URINE      3. Abdominal aortic atherosclerosis (HCC)        4. Coronary artery calcification seen on CT scan  ezetimibe (ZETIA) 10 MG Tab      5. Agatston CAC score 200-399  ezetimibe (ZETIA) 10 MG Tab      6. Prediabetes  HEMOGLOBIN A1C    MICROALBUMIN CREAT RATIO URINE      7. Long term (current) use of antithrombotics/antiplatelets        8. Other specified personal risk factors, not elsewhere classified  CT-CARDIAC SCORING        BLOOD PRESSURE CONTROL   Qualifies as Resistant HTN (RH):  No, BP at goal with <3 meds   Office BP Goal ACC/AHA (2017) goal <130/80  Home BP at goal:  yes  Office BP at goal:  yes  24h ABPM:  not ordered to date   Contributing factors: stress   Plan:   Monitoring:   - start/continue home BP monitoring, reviewed correct technique:  Yes   - order 24h ABPM: NO  -  monitor lytes/gfr routinely      ADHERENCE:   Assessment: Complete  Recommendations: Instructed to Continue Taking All Medications As Prescribed    LIFESTYLE INTERVENTIONS  TOBACCO:    reports that he has never smoked. He has never used smokeless tobacco.   - continued complete avoidance of all tobacco products   PHYSICAL ACTIVITY: >150min/week of mod-intensity activity or as much as tolerated  NUTRITION: DASH  and reduce Na to 1500mg/day   ETOH: limit to 2 or less standard drinks/day   WT MGMT: maintain healthy weight     STANDARD HTN MEDS:  - continue telmisartan-amlodipine 80/10mg daily  - stopped HCTZ due to potential cause of ED - monitor response of BP and ED     ADDITIONAL AGENTS:   -none at this time          WORK UP FOR SECONDARY CAUSES OF RH:  Obstructive Sleep Apnea: Not Yet Assessed  Renal parenchymal disease: Excluded  Renovascular HTN: Not Yet Assessed  Primary Aldosteronism: Not Yet Assessed  Thyroid Function: stable TSH on levothyroxine   Pheochromocytoma: Not Yet Assessed   Cushing's:   Not Yet Assessed   Coarctation of Aorta: excluded  Other:   # stress - SNS driven BP elevations     HTN-MEDIATED END-ORGAN DAMAGE:  Left Ventricular Hypertrophy: Not Assessed on   one   Date: n/a   Urine Albuminuria: None on Date: 2022  Renal Function: Chronic Kidney Disease  CKD G2 at worst   Ophthalmologic: Absent per patient report and/or eye specialist     Established CVD:     # Asymptomatic, subclinical CAD (evidenced by CACS = 310 (70%ile for age/gender) in 2022)  - no prior dx ASCVD events  - no LMA plaque noted   - no indications for functional testing w/o symptoms   - as reviewed with pt, ACC/AHA guidelines for chronic CAD mgmt (2023) support GDMT alone as initial mgmt citing multiple RCTs (ISCHEMIA, COURAGE, PRESTON 2D) demonstrating early revasc strategy plus GDMT did not improve MACE outcomes compared to GDMT alone  Plan:  - continue TLC and med mgmt as noted below   - consider functional testing if new  "symptoms over time      # Non-aneurysmal aortic atherosclerosis (AS) - CT, no symptoms or prior known associated clinical manifestations  - general indicator of systemic AS with higher potential AS in other vascular beds  - Higher overall ASCVD risk  Plan:  - no further imaging surveillance, continue med mgmt      LIPID MANAGEMENT:   Qualifies for Statin Therapy Based on 2018 ACC/AHA Guidelines: yes, Primary Prevention - 40-76yo, LDLc >70, <190 w/o DM  The 10-year ASCVD risk score (Carlton DK, et al., 2019) is: 23.3%, >20% \"high risk\"    TAVARES risk with CAC score = The estimated 10-year risk of a CHD event for a person with this risk factor profile including coronary calcium is 16.1%. The estimated 10-year risk of a CHD event for a person with this risk factor profile if we did not factor in their coronary calcium score would be 11.3%.  - TAVARES - 70% for age/gender   Major ASCVD events: CAD and/or CAC >300    High-risk conditions: N/A, >64yr old  and Hypertension   Risk-enhancers: Persistently elevated trigs >174   Currently on Statin: intolerant to daily rosuvastatin  Tx goals: LDL-C <70 (if HTG, consider apoB<80, non-HDL-C <100) due CACS >100  At goal? No, 3/2024   Plan:   - reinforced ongoing TLC measures as noted   - monitor labs   Meds:   - continue rosuva 20mg 3d/week - MAX TOLERATED   - restart zetia 10mg   - consider nexletol if <20% to LDL-C goal   - consider PCSK9i strategy if >20% to LDL-C goal     GLYCEMIA MANAGEMENT:  Prediabetic  Lab Results   Component Value Date    HBA1C 5.8 (H) 03/06/2024     Lab Results   Component Value Date/Time    MALBCRT see below 09/02/2022 10:19 AM    MICROALBUR <1.2 09/02/2022 10:19 AM    MICRALB <3.0 09/05/2024 04:40 AM    Plan:  - continue healthy diet, weight reduction, daily physical activity   - consider metformin up to 850mg BID to slow or offset progression to T2D as per DPP trial   - monitor labs Q6-12mo    ANTIPLATELET/ANTICOAGULANT THERAPY:    - has stopped ASA for " now per his request - worried about GI effects   CACS >100 favors aspirin therapy as per evidence from [facility] (2020) indicating net benefit in primary prevention patients who are at low risk for bleeding.      OTHER: none     STUDIES: repeat CACS 2025   LABS: as noted above  Follow up in: RTC in 5 months     Oh Muir M.D.  [facility]   [facility]   [phone]

## 2024-12-09 ENCOUNTER — PATIENT MESSAGE (OUTPATIENT)
Dept: VASCULAR LAB | Facility: MEDICAL CENTER | Age: 69
End: 2024-12-09
Payer: MEDICARE

## 2024-12-09 DIAGNOSIS — E78.2 MIXED HYPERLIPIDEMIA: ICD-10-CM

## 2024-12-09 DIAGNOSIS — R93.1 AGATSTON CAC SCORE 200-399: ICD-10-CM

## 2024-12-09 DIAGNOSIS — I25.10 CORONARY ARTERY CALCIFICATION SEEN ON CT SCAN: ICD-10-CM

## 2024-12-09 RX ORDER — EZETIMIBE 10 MG/1
10 TABLET ORAL DAILY
Qty: 100 TABLET | Refills: 3 | Status: SHIPPED | OUTPATIENT
Start: 2024-12-09

## 2025-03-26 ENCOUNTER — HOSPITAL ENCOUNTER (OUTPATIENT)
Dept: RADIOLOGY | Facility: MEDICAL CENTER | Age: 70
End: 2025-03-26
Attending: FAMILY MEDICINE
Payer: COMMERCIAL

## 2025-03-26 DIAGNOSIS — Z91.89 OTHER SPECIFIED PERSONAL RISK FACTORS, NOT ELSEWHERE CLASSIFIED: ICD-10-CM

## 2025-03-26 DIAGNOSIS — E78.2 MIXED HYPERLIPIDEMIA: ICD-10-CM

## 2025-03-26 PROCEDURE — 4410556 CT-CARDIAC SCORING (SELF PAY ONLY)

## 2025-03-27 ENCOUNTER — HOSPITAL ENCOUNTER (OUTPATIENT)
Dept: LAB | Facility: MEDICAL CENTER | Age: 70
End: 2025-03-27
Attending: FAMILY MEDICINE
Payer: MEDICARE

## 2025-03-27 ENCOUNTER — RESULTS FOLLOW-UP (OUTPATIENT)
Dept: VASCULAR LAB | Facility: MEDICAL CENTER | Age: 70
End: 2025-03-27
Payer: MEDICARE

## 2025-03-27 DIAGNOSIS — E78.2 MIXED HYPERLIPIDEMIA: ICD-10-CM

## 2025-03-27 DIAGNOSIS — R73.03 PREDIABETES: ICD-10-CM

## 2025-03-27 DIAGNOSIS — I10 PRIMARY HYPERTENSION: ICD-10-CM

## 2025-03-27 PROBLEM — I77.810 ASCENDING AORTA DILATION (HCC): Status: ACTIVE | Noted: 2025-03-27

## 2025-03-27 PROBLEM — E78.5 DYSLIPIDEMIA: Status: RESOLVED | Noted: 2022-01-31 | Resolved: 2025-03-27

## 2025-03-27 PROBLEM — R93.1 AGATSTON CAC SCORE 200-399: Status: RESOLVED | Noted: 2022-07-08 | Resolved: 2025-03-27

## 2025-03-27 PROBLEM — R93.1 AGATSTON CAC SCORE, >400: Status: ACTIVE | Noted: 2025-03-27

## 2025-03-27 LAB
ALBUMIN SERPL BCP-MCNC: 4.4 G/DL (ref 3.2–4.9)
ALBUMIN/GLOB SERPL: 1.6 G/DL
ALP SERPL-CCNC: 73 U/L (ref 30–99)
ALT SERPL-CCNC: 31 U/L (ref 2–50)
ANION GAP SERPL CALC-SCNC: 9 MMOL/L (ref 7–16)
AST SERPL-CCNC: 33 U/L (ref 12–45)
BILIRUB SERPL-MCNC: 0.8 MG/DL (ref 0.1–1.5)
BUN SERPL-MCNC: 14 MG/DL (ref 8–22)
CALCIUM ALBUM COR SERPL-MCNC: 8.9 MG/DL (ref 8.5–10.5)
CALCIUM SERPL-MCNC: 9.2 MG/DL (ref 8.5–10.5)
CHLORIDE SERPL-SCNC: 106 MMOL/L (ref 96–112)
CHOLEST SERPL-MCNC: 109 MG/DL (ref 100–199)
CO2 SERPL-SCNC: 26 MMOL/L (ref 20–33)
CREAT SERPL-MCNC: 0.96 MG/DL (ref 0.5–1.4)
CREAT UR-MCNC: 137 MG/DL
EST. AVERAGE GLUCOSE BLD GHB EST-MCNC: 105 MG/DL
GFR SERPLBLD CREATININE-BSD FMLA CKD-EPI: 85 ML/MIN/1.73 M 2
GLOBULIN SER CALC-MCNC: 2.8 G/DL (ref 1.9–3.5)
GLUCOSE SERPL-MCNC: 85 MG/DL (ref 65–99)
HBA1C MFR BLD: 5.3 % (ref 4–5.6)
HDLC SERPL-MCNC: 49 MG/DL
LDLC SERPL CALC-MCNC: 41 MG/DL
MICROALBUMIN UR-MCNC: <1.2 MG/DL
MICROALBUMIN/CREAT UR: NORMAL MG/G (ref 0–30)
POTASSIUM SERPL-SCNC: 4.5 MMOL/L (ref 3.6–5.5)
PROT SERPL-MCNC: 7.2 G/DL (ref 6–8.2)
SODIUM SERPL-SCNC: 141 MMOL/L (ref 135–145)
TRIGL SERPL-MCNC: 94 MG/DL (ref 0–149)

## 2025-03-27 PROCEDURE — 80053 COMPREHEN METABOLIC PANEL: CPT

## 2025-03-27 PROCEDURE — 82043 UR ALBUMIN QUANTITATIVE: CPT

## 2025-03-27 PROCEDURE — 80061 LIPID PANEL: CPT

## 2025-03-27 PROCEDURE — 82570 ASSAY OF URINE CREATININE: CPT

## 2025-03-27 PROCEDURE — 82172 ASSAY OF APOLIPOPROTEIN: CPT

## 2025-03-27 PROCEDURE — 36415 COLL VENOUS BLD VENIPUNCTURE: CPT | Mod: GA

## 2025-03-27 PROCEDURE — 83036 HEMOGLOBIN GLYCOSYLATED A1C: CPT | Mod: GA

## 2025-03-29 LAB — APO B100 SERPL-MCNC: 53 MG/DL (ref 66–133)

## 2025-04-14 ENCOUNTER — OFFICE VISIT (OUTPATIENT)
Dept: VASCULAR LAB | Facility: MEDICAL CENTER | Age: 70
End: 2025-04-14
Attending: FAMILY MEDICINE
Payer: MEDICARE

## 2025-04-14 VITALS
BODY MASS INDEX: 26.66 KG/M2 | SYSTOLIC BLOOD PRESSURE: 129 MMHG | HEART RATE: 52 BPM | WEIGHT: 180 LBS | DIASTOLIC BLOOD PRESSURE: 75 MMHG | HEIGHT: 69 IN

## 2025-04-14 DIAGNOSIS — I77.810 ASCENDING AORTA DILATION (HCC): Chronic | ICD-10-CM

## 2025-04-14 DIAGNOSIS — R73.03 PREDIABETES: ICD-10-CM

## 2025-04-14 DIAGNOSIS — I70.0 AORTIC ATHEROSCLEROSIS (HCC): Chronic | ICD-10-CM

## 2025-04-14 DIAGNOSIS — I10 PRIMARY HYPERTENSION: ICD-10-CM

## 2025-04-14 DIAGNOSIS — I25.10 CORONARY ARTERY CALCIFICATION SEEN ON CT SCAN: Chronic | ICD-10-CM

## 2025-04-14 DIAGNOSIS — Z79.02 LONG TERM (CURRENT) USE OF ANTITHROMBOTICS/ANTIPLATELETS: ICD-10-CM

## 2025-04-14 DIAGNOSIS — R93.1 AGATSTON CAC SCORE, >400: Chronic | ICD-10-CM

## 2025-04-14 DIAGNOSIS — E78.2 MIXED HYPERLIPIDEMIA: Chronic | ICD-10-CM

## 2025-04-14 PROCEDURE — G2211 COMPLEX E/M VISIT ADD ON: HCPCS | Performed by: FAMILY MEDICINE

## 2025-04-14 PROCEDURE — 3078F DIAST BP <80 MM HG: CPT | Performed by: FAMILY MEDICINE

## 2025-04-14 PROCEDURE — 99212 OFFICE O/P EST SF 10 MIN: CPT

## 2025-04-14 PROCEDURE — 3074F SYST BP LT 130 MM HG: CPT | Performed by: FAMILY MEDICINE

## 2025-04-14 PROCEDURE — 99214 OFFICE O/P EST MOD 30 MIN: CPT | Performed by: FAMILY MEDICINE

## 2025-04-14 ASSESSMENT — ENCOUNTER SYMPTOMS
PALPITATIONS: 0
WEAKNESS: 0
MYALGIAS: 0
NAUSEA: 0
COUGH: 0
BRUISES/BLEEDS EASILY: 0
FEVER: 0
CHILLS: 0
CLAUDICATION: 0
ORTHOPNEA: 0

## 2025-04-14 ASSESSMENT — FIBROSIS 4 INDEX: FIB4 SCORE: 1.55

## 2025-04-15 RX ORDER — CIPROFLOXACIN 500 MG/1
TABLET, FILM COATED ORAL
COMMUNITY
Start: 2025-02-11

## 2025-04-15 RX ORDER — TADALAFIL 5 MG/1
TABLET ORAL
COMMUNITY
Start: 2025-02-08

## 2025-06-02 PROBLEM — M70.21 OLECRANON BURSITIS, RIGHT ELBOW: Status: ACTIVE | Noted: 2025-06-02

## 2025-06-02 PROBLEM — S46.311A: Status: ACTIVE | Noted: 2025-06-02

## 2025-08-03 SDOH — ECONOMIC STABILITY: FOOD INSECURITY: WITHIN THE PAST 12 MONTHS, THE FOOD YOU BOUGHT JUST DIDN'T LAST AND YOU DIDN'T HAVE MONEY TO GET MORE.: NEVER TRUE

## 2025-08-03 SDOH — ECONOMIC STABILITY: TRANSPORTATION INSECURITY
IN THE PAST 12 MONTHS, HAS LACK OF TRANSPORTATION KEPT YOU FROM MEETINGS, WORK, OR FROM GETTING THINGS NEEDED FOR DAILY LIVING?: NO

## 2025-08-03 SDOH — HEALTH STABILITY: MENTAL HEALTH
STRESS IS WHEN SOMEONE FEELS TENSE, NERVOUS, ANXIOUS, OR CAN'T SLEEP AT NIGHT BECAUSE THEIR MIND IS TROUBLED. HOW STRESSED ARE YOU?: ONLY A LITTLE

## 2025-08-03 SDOH — HEALTH STABILITY: PHYSICAL HEALTH: ON AVERAGE, HOW MANY MINUTES DO YOU ENGAGE IN EXERCISE AT THIS LEVEL?: 60 MIN

## 2025-08-03 SDOH — ECONOMIC STABILITY: INCOME INSECURITY: IN THE LAST 12 MONTHS, WAS THERE A TIME WHEN YOU WERE NOT ABLE TO PAY THE MORTGAGE OR RENT ON TIME?: NO

## 2025-08-03 SDOH — ECONOMIC STABILITY: FOOD INSECURITY: WITHIN THE PAST 12 MONTHS, YOU WORRIED THAT YOUR FOOD WOULD RUN OUT BEFORE YOU GOT MONEY TO BUY MORE.: NEVER TRUE

## 2025-08-03 SDOH — HEALTH STABILITY: PHYSICAL HEALTH: ON AVERAGE, HOW MANY DAYS PER WEEK DO YOU ENGAGE IN MODERATE TO STRENUOUS EXERCISE (LIKE A BRISK WALK)?: 4 DAYS

## 2025-08-03 SDOH — ECONOMIC STABILITY: INCOME INSECURITY: HOW HARD IS IT FOR YOU TO PAY FOR THE VERY BASICS LIKE FOOD, HOUSING, MEDICAL CARE, AND HEATING?: NOT HARD AT ALL

## 2025-08-03 SDOH — ECONOMIC STABILITY: HOUSING INSECURITY
IN THE LAST 12 MONTHS, WAS THERE A TIME WHEN YOU DID NOT HAVE A STEADY PLACE TO SLEEP OR SLEPT IN A SHELTER (INCLUDING NOW)?: NO

## 2025-08-03 SDOH — ECONOMIC STABILITY: TRANSPORTATION INSECURITY
IN THE PAST 12 MONTHS, HAS THE LACK OF TRANSPORTATION KEPT YOU FROM MEDICAL APPOINTMENTS OR FROM GETTING MEDICATIONS?: NO

## 2025-08-03 SDOH — ECONOMIC STABILITY: TRANSPORTATION INSECURITY
IN THE PAST 12 MONTHS, HAS LACK OF RELIABLE TRANSPORTATION KEPT YOU FROM MEDICAL APPOINTMENTS, MEETINGS, WORK OR FROM GETTING THINGS NEEDED FOR DAILY LIVING?: NO

## 2025-08-03 ASSESSMENT — SOCIAL DETERMINANTS OF HEALTH (SDOH)
HOW OFTEN DO YOU GET TOGETHER WITH FRIENDS OR RELATIVES?: TWICE A WEEK
IN THE PAST 12 MONTHS, HAS THE ELECTRIC, GAS, OIL, OR WATER COMPANY THREATENED TO SHUT OFF SERVICE IN YOUR HOME?: NO
HOW OFTEN DO YOU HAVE A DRINK CONTAINING ALCOHOL: 2-3 TIMES A WEEK
HOW OFTEN DO YOU ATTEND CHURCH OR RELIGIOUS SERVICES?: NEVER
HOW OFTEN DO YOU HAVE SIX OR MORE DRINKS ON ONE OCCASION: NEVER
WITHIN THE PAST 12 MONTHS, YOU WORRIED THAT YOUR FOOD WOULD RUN OUT BEFORE YOU GOT THE MONEY TO BUY MORE: NEVER TRUE
HOW MANY DRINKS CONTAINING ALCOHOL DO YOU HAVE ON A TYPICAL DAY WHEN YOU ARE DRINKING: 1 OR 2
HOW HARD IS IT FOR YOU TO PAY FOR THE VERY BASICS LIKE FOOD, HOUSING, MEDICAL CARE, AND HEATING?: NOT HARD AT ALL
HOW OFTEN DO YOU GET TOGETHER WITH FRIENDS OR RELATIVES?: TWICE A WEEK
ARE YOU MARRIED, WIDOWED, DIVORCED, SEPARATED, NEVER MARRIED, OR LIVING WITH A PARTNER?: LIVING WITH PARTNER
HOW OFTEN DO YOU ATTEND CHURCH OR RELIGIOUS SERVICES?: NEVER
ARE YOU MARRIED, WIDOWED, DIVORCED, SEPARATED, NEVER MARRIED, OR LIVING WITH A PARTNER?: LIVING WITH PARTNER

## 2025-08-03 ASSESSMENT — LIFESTYLE VARIABLES
HOW OFTEN DO YOU HAVE A DRINK CONTAINING ALCOHOL: 2-3 TIMES A WEEK
HOW MANY STANDARD DRINKS CONTAINING ALCOHOL DO YOU HAVE ON A TYPICAL DAY: 1 OR 2
HOW OFTEN DO YOU HAVE SIX OR MORE DRINKS ON ONE OCCASION: NEVER
AUDIT-C TOTAL SCORE: 3
SKIP TO QUESTIONS 9-10: 1

## 2025-08-05 ENCOUNTER — OFFICE VISIT (OUTPATIENT)
Dept: MEDICAL GROUP | Age: 70
End: 2025-08-05
Payer: MEDICARE

## 2025-08-05 VITALS
OXYGEN SATURATION: 98 % | WEIGHT: 175 LBS | HEART RATE: 55 BPM | BODY MASS INDEX: 25.92 KG/M2 | HEIGHT: 69 IN | TEMPERATURE: 98.3 F | SYSTOLIC BLOOD PRESSURE: 118 MMHG | DIASTOLIC BLOOD PRESSURE: 66 MMHG

## 2025-08-05 DIAGNOSIS — Z11.3 SCREEN FOR STD (SEXUALLY TRANSMITTED DISEASE): ICD-10-CM

## 2025-08-05 DIAGNOSIS — G47.00 INSOMNIA, UNSPECIFIED TYPE: ICD-10-CM

## 2025-08-05 DIAGNOSIS — Z76.89 ENCOUNTER TO ESTABLISH CARE: Primary | ICD-10-CM

## 2025-08-05 DIAGNOSIS — I70.0 AORTIC ATHEROSCLEROSIS (HCC): ICD-10-CM

## 2025-08-05 DIAGNOSIS — Z12.5 PROSTATE CANCER SCREENING: ICD-10-CM

## 2025-08-05 DIAGNOSIS — R73.03 PREDIABETES: ICD-10-CM

## 2025-08-05 DIAGNOSIS — E66.3 OVERWEIGHT (BMI 25.0-29.9): ICD-10-CM

## 2025-08-05 DIAGNOSIS — N52.9 ERECTILE DYSFUNCTION, UNSPECIFIED ERECTILE DYSFUNCTION TYPE: ICD-10-CM

## 2025-08-05 DIAGNOSIS — Z13.29 THYROID DISORDER SCREEN: ICD-10-CM

## 2025-08-05 DIAGNOSIS — Z13.0 SCREENING FOR DEFICIENCY ANEMIA: ICD-10-CM

## 2025-08-05 DIAGNOSIS — I77.810 ASCENDING AORTA DILATION (HCC): ICD-10-CM

## 2025-08-05 DIAGNOSIS — E78.2 MIXED HYPERLIPIDEMIA: ICD-10-CM

## 2025-08-05 DIAGNOSIS — R93.1 AGATSTON CAC SCORE, >400: ICD-10-CM

## 2025-08-05 DIAGNOSIS — E03.8 OTHER SPECIFIED HYPOTHYROIDISM: ICD-10-CM

## 2025-08-05 PROBLEM — Z79.02 LONG TERM (CURRENT) USE OF ANTITHROMBOTICS/ANTIPLATELETS: Status: RESOLVED | Noted: 2024-03-11 | Resolved: 2025-08-05

## 2025-08-05 PROCEDURE — 3078F DIAST BP <80 MM HG: CPT | Performed by: STUDENT IN AN ORGANIZED HEALTH CARE EDUCATION/TRAINING PROGRAM

## 2025-08-05 PROCEDURE — 3074F SYST BP LT 130 MM HG: CPT | Performed by: STUDENT IN AN ORGANIZED HEALTH CARE EDUCATION/TRAINING PROGRAM

## 2025-08-05 PROCEDURE — 99204 OFFICE O/P NEW MOD 45 MIN: CPT | Performed by: STUDENT IN AN ORGANIZED HEALTH CARE EDUCATION/TRAINING PROGRAM

## 2025-08-05 RX ORDER — ALPRAZOLAM 0.5 MG
0.5 TABLET ORAL
COMMUNITY

## 2025-08-05 RX ORDER — TRAZODONE HYDROCHLORIDE 100 MG/1
100 TABLET ORAL NIGHTLY
COMMUNITY

## 2025-08-05 ASSESSMENT — PATIENT HEALTH QUESTIONNAIRE - PHQ9: CLINICAL INTERPRETATION OF PHQ2 SCORE: 0

## 2025-08-05 ASSESSMENT — FIBROSIS 4 INDEX: FIB4 SCORE: 1.55

## 2025-08-12 ENCOUNTER — HOSPITAL ENCOUNTER (OUTPATIENT)
Dept: LAB | Facility: MEDICAL CENTER | Age: 70
End: 2025-08-12
Attending: FAMILY MEDICINE
Payer: MEDICARE

## 2025-08-12 ENCOUNTER — HOSPITAL ENCOUNTER (OUTPATIENT)
Dept: LAB | Facility: MEDICAL CENTER | Age: 70
End: 2025-08-12
Attending: STUDENT IN AN ORGANIZED HEALTH CARE EDUCATION/TRAINING PROGRAM
Payer: MEDICARE

## 2025-08-12 DIAGNOSIS — Z11.3 SCREEN FOR STD (SEXUALLY TRANSMITTED DISEASE): ICD-10-CM

## 2025-08-12 DIAGNOSIS — Z13.0 SCREENING FOR DEFICIENCY ANEMIA: ICD-10-CM

## 2025-08-12 DIAGNOSIS — Z12.5 PROSTATE CANCER SCREENING: ICD-10-CM

## 2025-08-12 DIAGNOSIS — Z13.29 THYROID DISORDER SCREEN: ICD-10-CM

## 2025-08-12 DIAGNOSIS — R73.03 PREDIABETES: ICD-10-CM

## 2025-08-12 DIAGNOSIS — E78.2 MIXED HYPERLIPIDEMIA: Chronic | ICD-10-CM

## 2025-08-12 LAB
ALBUMIN SERPL BCP-MCNC: 4.2 G/DL (ref 3.2–4.9)
ALBUMIN/GLOB SERPL: 1.7 G/DL
ALP SERPL-CCNC: 71 U/L (ref 30–99)
ALT SERPL-CCNC: 26 U/L (ref 2–50)
ANION GAP SERPL CALC-SCNC: 12 MMOL/L (ref 7–16)
AST SERPL-CCNC: 22 U/L (ref 12–45)
BILIRUB SERPL-MCNC: 0.7 MG/DL (ref 0.1–1.5)
BUN SERPL-MCNC: 14 MG/DL (ref 8–22)
CALCIUM ALBUM COR SERPL-MCNC: 9.2 MG/DL (ref 8.5–10.5)
CALCIUM SERPL-MCNC: 9.4 MG/DL (ref 8.5–10.5)
CHLORIDE SERPL-SCNC: 104 MMOL/L (ref 96–112)
CHOLEST SERPL-MCNC: 103 MG/DL (ref 100–199)
CO2 SERPL-SCNC: 23 MMOL/L (ref 20–33)
CREAT SERPL-MCNC: 0.95 MG/DL (ref 0.5–1.4)
ERYTHROCYTE [DISTWIDTH] IN BLOOD BY AUTOMATED COUNT: 43.8 FL (ref 35.9–50)
EST. AVERAGE GLUCOSE BLD GHB EST-MCNC: 105 MG/DL
GFR SERPLBLD CREATININE-BSD FMLA CKD-EPI: 86 ML/MIN/1.73 M 2
GLOBULIN SER CALC-MCNC: 2.5 G/DL (ref 1.9–3.5)
GLUCOSE SERPL-MCNC: 85 MG/DL (ref 65–99)
HBA1C MFR BLD: 5.3 % (ref 4–5.6)
HCT VFR BLD AUTO: 49.2 % (ref 42–52)
HDLC SERPL-MCNC: 49 MG/DL
HGB BLD-MCNC: 16.6 G/DL (ref 14–18)
HIV 1+2 AB+HIV1 P24 AG SERPL QL IA: NORMAL
LDLC SERPL CALC-MCNC: 39 MG/DL
MCH RBC QN AUTO: 31.8 PG (ref 27–33)
MCHC RBC AUTO-ENTMCNC: 33.7 G/DL (ref 32.3–36.5)
MCV RBC AUTO: 94.3 FL (ref 81.4–97.8)
PLATELET # BLD AUTO: 243 K/UL (ref 164–446)
PMV BLD AUTO: 11.4 FL (ref 9–12.9)
POTASSIUM SERPL-SCNC: 4.3 MMOL/L (ref 3.6–5.5)
PROT SERPL-MCNC: 6.7 G/DL (ref 6–8.2)
PSA SERPL DL<=0.01 NG/ML-MCNC: 1.57 NG/ML (ref 0–4)
RBC # BLD AUTO: 5.22 M/UL (ref 4.7–6.1)
SODIUM SERPL-SCNC: 139 MMOL/L (ref 135–145)
T PALLIDUM AB SER QL IA: NORMAL
TRIGL SERPL-MCNC: 77 MG/DL (ref 0–149)
TSH SERPL DL<=0.005 MIU/L-ACNC: 2.63 UIU/ML (ref 0.38–5.33)
WBC # BLD AUTO: 8.1 K/UL (ref 4.8–10.8)

## 2025-08-12 PROCEDURE — 80053 COMPREHEN METABOLIC PANEL: CPT

## 2025-08-12 PROCEDURE — G0475 HIV COMBINATION ASSAY: HCPCS | Mod: GA

## 2025-08-12 PROCEDURE — 86780 TREPONEMA PALLIDUM: CPT | Mod: GA

## 2025-08-12 PROCEDURE — 84153 ASSAY OF PSA TOTAL: CPT | Mod: GA

## 2025-08-12 PROCEDURE — 82172 ASSAY OF APOLIPOPROTEIN: CPT

## 2025-08-12 PROCEDURE — 83036 HEMOGLOBIN GLYCOSYLATED A1C: CPT | Mod: GA

## 2025-08-12 PROCEDURE — 85027 COMPLETE CBC AUTOMATED: CPT

## 2025-08-12 PROCEDURE — 80061 LIPID PANEL: CPT

## 2025-08-12 PROCEDURE — 84443 ASSAY THYROID STIM HORMONE: CPT

## 2025-08-12 PROCEDURE — 36415 COLL VENOUS BLD VENIPUNCTURE: CPT

## 2025-08-13 LAB — APO B100 SERPL-MCNC: 48 MG/DL (ref 66–133)

## 2025-08-20 ENCOUNTER — APPOINTMENT (OUTPATIENT)
Dept: URBAN - METROPOLITAN AREA CLINIC 15 | Facility: CLINIC | Age: 70
Setting detail: DERMATOLOGY
End: 2025-08-20

## 2025-08-20 DIAGNOSIS — L90.5 SCAR CONDITIONS AND FIBROSIS OF SKIN: ICD-10-CM

## 2025-08-20 DIAGNOSIS — L81.4 OTHER MELANIN HYPERPIGMENTATION: ICD-10-CM

## 2025-08-20 DIAGNOSIS — Z71.89 OTHER SPECIFIED COUNSELING: ICD-10-CM

## 2025-08-20 DIAGNOSIS — D18.0 HEMANGIOMA: ICD-10-CM

## 2025-08-20 DIAGNOSIS — L57.0 ACTINIC KERATOSIS: ICD-10-CM

## 2025-08-20 DIAGNOSIS — D22 MELANOCYTIC NEVI: ICD-10-CM

## 2025-08-20 DIAGNOSIS — L82.1 OTHER SEBORRHEIC KERATOSIS: ICD-10-CM

## 2025-08-20 DIAGNOSIS — L72.8 OTHER FOLLICULAR CYSTS OF THE SKIN AND SUBCUTANEOUS TISSUE: ICD-10-CM

## 2025-08-20 DIAGNOSIS — L57.8 OTHER SKIN CHANGES DUE TO CHRONIC EXPOSURE TO NONIONIZING RADIATION: ICD-10-CM

## 2025-08-20 DIAGNOSIS — D69.2 OTHER NONTHROMBOCYTOPENIC PURPURA: ICD-10-CM

## 2025-08-20 PROBLEM — D22.5 MELANOCYTIC NEVI OF TRUNK: Status: ACTIVE | Noted: 2025-08-20

## 2025-08-20 PROBLEM — D18.01 HEMANGIOMA OF SKIN AND SUBCUTANEOUS TISSUE: Status: ACTIVE | Noted: 2025-08-20

## 2025-08-20 PROCEDURE — ? COUNSELING

## 2025-08-20 PROCEDURE — ? SUNSCREEN RECOMMENDATIONS

## 2025-08-20 PROCEDURE — ? LIQUID NITROGEN

## 2025-08-20 ASSESSMENT — LOCATION ZONE DERM
LOCATION ZONE: TRUNK
LOCATION ZONE: HAND
LOCATION ZONE: FACE
LOCATION ZONE: ARM
LOCATION ZONE: SCALP

## 2025-08-20 ASSESSMENT — LOCATION DETAILED DESCRIPTION DERM
LOCATION DETAILED: LEFT PROXIMAL DORSAL FOREARM
LOCATION DETAILED: RIGHT PROXIMAL DORSAL FOREARM
LOCATION DETAILED: LEFT BUTTOCK
LOCATION DETAILED: RIGHT ELBOW
LOCATION DETAILED: RIGHT BUTTOCK
LOCATION DETAILED: LEFT CENTRAL TEMPLE
LOCATION DETAILED: RIGHT SUPERIOR LATERAL LOWER BACK
LOCATION DETAILED: RIGHT SUPERIOR OCCIPITAL SCALP
LOCATION DETAILED: RIGHT RADIAL DORSAL HAND
LOCATION DETAILED: LEFT POSTERIOR SHOULDER

## 2025-08-20 ASSESSMENT — LOCATION SIMPLE DESCRIPTION DERM
LOCATION SIMPLE: LEFT TEMPLE
LOCATION SIMPLE: RIGHT BUTTOCK
LOCATION SIMPLE: LEFT FOREARM
LOCATION SIMPLE: LEFT SHOULDER
LOCATION SIMPLE: RIGHT HAND
LOCATION SIMPLE: LEFT BUTTOCK
LOCATION SIMPLE: RIGHT OCCIPITAL SCALP
LOCATION SIMPLE: RIGHT FOREARM
LOCATION SIMPLE: RIGHT LOWER BACK
LOCATION SIMPLE: RIGHT ELBOW

## 2025-08-22 ENCOUNTER — HOSPITAL ENCOUNTER (OUTPATIENT)
Dept: CARDIOLOGY | Facility: MEDICAL CENTER | Age: 70
End: 2025-08-22
Attending: FAMILY MEDICINE
Payer: MEDICARE

## 2025-08-22 DIAGNOSIS — I77.810 ASCENDING AORTA DILATION (HCC): Chronic | ICD-10-CM

## 2025-08-22 DIAGNOSIS — I70.0 AORTIC ATHEROSCLEROSIS (HCC): Chronic | ICD-10-CM

## 2025-08-22 LAB
LV EJECT FRACT  99904: 60
LV EJECT FRACT MOD 2C 99903: 55.56
LV EJECT FRACT MOD 4C 99902: 58.83
LV EJECT FRACT MOD BP 99901: 58.41

## 2025-08-22 PROCEDURE — 93306 TTE W/DOPPLER COMPLETE: CPT

## 2025-08-22 PROCEDURE — 93306 TTE W/DOPPLER COMPLETE: CPT | Mod: 26 | Performed by: INTERNAL MEDICINE

## 2025-08-26 ENCOUNTER — DOCUMENTATION (OUTPATIENT)
Dept: VASCULAR LAB | Facility: MEDICAL CENTER | Age: 70
End: 2025-08-26
Payer: MEDICARE

## 2025-08-26 ENCOUNTER — RESULTS FOLLOW-UP (OUTPATIENT)
Dept: VASCULAR LAB | Facility: MEDICAL CENTER | Age: 70
End: 2025-08-26
Payer: MEDICARE